# Patient Record
Sex: FEMALE | Race: NATIVE HAWAIIAN OR OTHER PACIFIC ISLANDER | Employment: OTHER | URBAN - METROPOLITAN AREA
[De-identification: names, ages, dates, MRNs, and addresses within clinical notes are randomized per-mention and may not be internally consistent; named-entity substitution may affect disease eponyms.]

---

## 2017-06-21 PROBLEM — F17.210 MODERATE CIGARETTE SMOKER: Status: ACTIVE | Noted: 2017-06-21

## 2017-06-21 PROBLEM — K21.9 GASTROESOPHAGEAL REFLUX DISEASE WITHOUT ESOPHAGITIS: Status: ACTIVE | Noted: 2017-06-21

## 2017-06-21 PROBLEM — J45.40 MODERATE PERSISTENT ASTHMA WITHOUT COMPLICATION: Status: ACTIVE | Noted: 2017-06-21

## 2017-06-21 PROBLEM — F31.12 BIPOLAR AFFECTIVE DISORDER, CURRENTLY MANIC, MODERATE (HCC): Status: ACTIVE | Noted: 2017-06-21

## 2017-09-27 PROBLEM — K21.00 GASTROESOPHAGEAL REFLUX DISEASE WITH ESOPHAGITIS: Status: ACTIVE | Noted: 2017-06-21

## 2017-10-05 PROBLEM — F63.9 IMPULSE CONTROL DISORDER IN ADULT: Status: ACTIVE | Noted: 2017-10-05

## 2017-12-18 PROBLEM — K76.0 FATTY LIVER DISEASE, NONALCOHOLIC: Status: ACTIVE | Noted: 2017-12-18

## 2017-12-18 PROBLEM — K80.10 CALCULUS OF GALLBLADDER WITH CHRONIC CHOLECYSTITIS: Status: ACTIVE | Noted: 2017-12-18

## 2017-12-22 PROBLEM — F41.9 ANXIETY: Status: ACTIVE | Noted: 2017-12-22

## 2018-03-30 PROBLEM — K80.10 CALCULUS OF GALLBLADDER WITH CHRONIC CHOLECYSTITIS: Status: RESOLVED | Noted: 2017-12-18 | Resolved: 2018-03-30

## 2018-03-30 PROBLEM — R27.8: Status: ACTIVE | Noted: 2017-12-07
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
## 2018-03-30 PROBLEM — F31.9 BIPOLAR DISORDER (HCC): Status: ACTIVE | Noted: 2018-03-30

## 2018-03-30 PROBLEM — J45.909 ASTHMA: Status: ACTIVE | Noted: 2018-03-30

## 2018-03-30 PROBLEM — Z87.828 HISTORY OF MOTOR VEHICLE ACCIDENT: Status: ACTIVE | Noted: 2018-03-20

## 2018-06-29 PROBLEM — Z91.14 NOT TAKING MEDICATION AS DIRECTED: Status: ACTIVE | Noted: 2018-06-29

## 2018-07-01 ENCOUNTER — HOSPITAL ENCOUNTER (OUTPATIENT)
Dept: SLEEP MEDICINE | Age: 23
Discharge: HOME | End: 2018-07-01
Payer: MEDICARE

## 2018-07-01 PROCEDURE — 95811 POLYSOM 6/>YRS CPAP 4/> PARM: CPT

## 2018-07-05 PROBLEM — Z99.89 OSA ON CPAP: Status: ACTIVE | Noted: 2018-07-05

## 2018-07-05 PROBLEM — G47.33 OSA ON CPAP: Status: ACTIVE | Noted: 2018-07-05

## 2018-07-10 PROBLEM — G47.10 HYPERSOMNIA: Status: ACTIVE | Noted: 2018-07-10

## 2018-07-10 PROBLEM — G47.33 OSA (OBSTRUCTIVE SLEEP APNEA): Status: ACTIVE | Noted: 2018-07-10

## 2018-07-10 PROBLEM — E66.01 MORBID OBESITY WITH BMI OF 50.0-59.9, ADULT (HCC): Status: ACTIVE | Noted: 2018-07-10

## 2018-07-10 PROBLEM — Z72.821 POOR SLEEP HYGIENE: Status: ACTIVE | Noted: 2018-07-10

## 2018-09-14 PROBLEM — L84 HEEL CALLUS: Status: ACTIVE | Noted: 2018-09-14

## 2018-09-14 PROBLEM — R23.4 FISSURE IN SKIN: Status: ACTIVE | Noted: 2018-09-14

## 2019-01-11 PROBLEM — R23.4 FISSURE IN SKIN: Status: RESOLVED | Noted: 2018-09-14 | Resolved: 2019-01-11

## 2019-06-19 PROBLEM — Z90.49 HISTORY OF CHOLECYSTECTOMY: Status: ACTIVE | Noted: 2019-06-19

## 2019-08-22 PROBLEM — Z86.19 HISTORY OF HELICOBACTER INFECTION: Status: ACTIVE | Noted: 2019-08-06

## 2019-08-22 PROBLEM — A04.8 HELICOBACTER PYLORI INFECTION: Status: ACTIVE | Noted: 2019-08-06

## 2019-12-16 PROBLEM — G47.10 HYPERSOMNIA: Status: RESOLVED | Noted: 2018-07-10 | Resolved: 2019-12-16

## 2020-09-28 ENCOUNTER — APPOINTMENT (OUTPATIENT)
Dept: GENERAL RADIOLOGY | Age: 25
End: 2020-09-28
Attending: EMERGENCY MEDICINE
Payer: MEDICARE

## 2020-09-28 ENCOUNTER — HOSPITAL ENCOUNTER (EMERGENCY)
Age: 25
Discharge: HOME | End: 2020-09-28
Attending: EMERGENCY MEDICINE
Payer: MEDICARE

## 2020-09-28 VITALS
TEMPERATURE: 98.1 F | RESPIRATION RATE: 14 BRPM | OXYGEN SATURATION: 99 % | DIASTOLIC BLOOD PRESSURE: 68 MMHG | WEIGHT: 293 LBS | SYSTOLIC BLOOD PRESSURE: 139 MMHG | HEIGHT: 64 IN | BODY MASS INDEX: 50.02 KG/M2 | HEART RATE: 107 BPM

## 2020-09-28 DIAGNOSIS — J45.901 ACUTE EXACERBATION OF EXTRINSIC ASTHMA: Primary | ICD-10-CM

## 2020-09-28 PROCEDURE — 74011250636 HC RX REV CODE- 250/636: Performed by: EMERGENCY MEDICINE

## 2020-09-28 PROCEDURE — 94644 CONT INHLJ TX 1ST HOUR: CPT

## 2020-09-28 PROCEDURE — 71045 X-RAY EXAM CHEST 1 VIEW: CPT

## 2020-09-28 PROCEDURE — 77030013140 HC MSK NEB VYRM -A

## 2020-09-28 PROCEDURE — 94760 N-INVAS EAR/PLS OXIMETRY 1: CPT

## 2020-09-28 PROCEDURE — 99284 EMERGENCY DEPT VISIT MOD MDM: CPT

## 2020-09-28 PROCEDURE — 96374 THER/PROPH/DIAG INJ IV PUSH: CPT

## 2020-09-28 PROCEDURE — 74011000250 HC RX REV CODE- 250: Performed by: EMERGENCY MEDICINE

## 2020-09-28 PROCEDURE — 77010033678 HC OXYGEN DAILY

## 2020-09-28 RX ORDER — ALBUTEROL SULFATE 0.83 MG/ML
2.5 SOLUTION RESPIRATORY (INHALATION)
Qty: 25 EACH | Refills: 0 | Status: SHIPPED | OUTPATIENT
Start: 2020-09-28 | End: 2020-10-12 | Stop reason: SDUPTHER

## 2020-09-28 RX ORDER — DEXAMETHASONE SODIUM PHOSPHATE 100 MG/10ML
10 INJECTION INTRAMUSCULAR; INTRAVENOUS
Status: COMPLETED | OUTPATIENT
Start: 2020-09-28 | End: 2020-09-28

## 2020-09-28 RX ORDER — ALBUTEROL SULFATE 0.83 MG/ML
10 SOLUTION RESPIRATORY (INHALATION)
Status: COMPLETED | OUTPATIENT
Start: 2020-09-28 | End: 2020-09-28

## 2020-09-28 RX ADMIN — DEXAMETHASONE SODIUM PHOSPHATE 10 MG: 10 INJECTION INTRAMUSCULAR; INTRAVENOUS at 10:11

## 2020-09-28 RX ADMIN — ALBUTEROL SULFATE 10 MG: 2.5 SOLUTION RESPIRATORY (INHALATION) at 10:00

## 2020-09-28 NOTE — ED NOTES
Report received from [facility] <--- Click to Launch ICDx for PreOp, PostOp and Procedure

## 2020-09-28 NOTE — DISCHARGE INSTRUCTIONS
May try nebulizer every 6-8 hours. Finish current medications. Recheck with your doctor 2 to 3 days if not improving. Recheck sooner for worse or worrisome symptoms. Patient Education        Asthma Attack: Care Instructions  Your Care Instructions     During an asthma attack, the airways swell and narrow. This makes it hard to breathe. Severe asthma attacks can be life-threatening, but you can help prevent them by keeping your asthma under control and treating symptoms before they get bad. Symptoms include being short of breath, having chest tightness, coughing, and wheezing. Noting and treating these symptoms can also help you avoid future trips to the emergency room. The doctor has checked you carefully, but problems can develop later. If you notice any problems or new symptoms, get medical treatment right away. Follow-up care is a key part of your treatment and safety. Be sure to make and go to all appointments, and call your doctor if you are having problems. It's also a good idea to know your test results and keep a list of the medicines you take. How can you care for yourself at home? · Follow your asthma action plan to prevent and treat attacks. If you don't have an asthma action plan, work with your doctor to create one. · Take your asthma medicines exactly as prescribed. Talk to your doctor right away if you have any questions about how to take them. ? Use your quick-relief medicine when you have symptoms of an attack. Quick-relief medicine is usually an albuterol inhaler. Some people need to use quick-relief medicine before they exercise. ? Take your controller medicine every day, not just when you have symptoms. Controller medicine is usually an inhaled corticosteroid. The goal is to prevent problems before they occur. Don't use your controller medicine to treat an attack that has already started. It doesn't work fast enough to help.   ? If your doctor prescribed corticosteroid pills to use during an attack, take them exactly as prescribed. It may take hours for the pills to work, but they may make the episode shorter and help you breathe better. ? Keep your quick-relief medicine with you at all times. · Talk to your doctor before using other medicines. Some medicines, such as aspirin, can cause asthma attacks in some people. · If you have a peak flow meter, use it to check how well you are breathing. This can help you predict when an asthma attack is going to occur. Then you can take medicine to prevent the asthma attack or make it less severe. · Do not smoke or allow others to smoke around you. Avoid smoky places. Smoking makes asthma worse. If you need help quitting, talk to your doctor about stop-smoking programs and medicines. These can increase your chances of quitting for good. · Learn what triggers an asthma attack for you, and avoid the triggers when you can. Common triggers include colds, smoke, air pollution, dust, pollen, mold, pets, cockroaches, stress, and cold air. · Avoid colds and the flu. Get a pneumococcal vaccine shot. If you have had one before, ask your doctor if you need a second dose. Get a flu vaccine every fall. If you must be around people with colds or the flu, wash your hands often. When should you call for help? Call 911 anytime you think you may need emergency care. For example, call if:    · You have severe trouble breathing. Call your doctor now or seek immediate medical care if:    · Your symptoms do not get better after you have followed your asthma action plan.     · You have new or worse trouble breathing.     · Your coughing and wheezing get worse.     · You cough up dark brown or bloody mucus (sputum).     · You have a new or higher fever.    Watch closely for changes in your health, and be sure to contact your doctor if:    · You need to use quick-relief medicine on more than 2 days a week (unless it is just for exercise).     · You cough more deeply or more often, especially if you notice more mucus or a change in the color of your mucus.     · You are not getting better as expected. Where can you learn more? Go to http://www.gray.com/  Enter F886 in the search box to learn more about \"Asthma Attack: Care Instructions. \"  Current as of: February 24, 2020               Content Version: 12.6  © 2625-3642 [employer]. Care instructions adapted under license by [employer] (which disclaims liability or warranty for this information). If you have questions about a medical condition or this instruction, always ask your healthcare professional. [place] [address] any warranty or liability for your use of this information.

## 2020-09-28 NOTE — ED NOTES
I have reviewed discharge instructions with the patient. The patient verbalized understanding. Patient left ED via Discharge Method: ambulatory to Home with family. Opportunity for questions and clarification provided. Patient given 1 scripts. Albuterol neb solution. Pt given nebulizer machine by case management. Instructed to follow up with PCP and finish current medications. To continue your aftercare when you leave the hospital, you may receive an automated call from our care team to check in on how you are doing. This is a free service and part of our promise to provide the best care and service to meet your aftercare needs.  If you have questions, or wish to unsubscribe from this service please call [phone]. Thank you for Choosing our [facility] Emergency Department.

## 2020-09-28 NOTE — ED TRIAGE NOTES
Pt arrives wearing home mask. Presents with initial complaint of \"lip ring stuck in lip. \" Lower lip is swollen. Pt then states that she is possibly having an allergic reaction to a [place] latte\" with itching to hands/feet and feeling like she is having difficulty breathing. Pt's O2 sats 86% RA. Some wheezing heard. Tested negative for COVID on Friday.

## 2020-09-28 NOTE — PROGRESS NOTES
Billing Type: Third-Party Bill X-ray delay. Breathing treatment for approx 30 min. Performing Laboratory: [phone] Expected Date Of Service: 01/22/2020 Bill For Surgical Tray: no

## 2020-09-28 NOTE — ED PROVIDER NOTES
21-year-old female states some swelling of her lip today. She has a lip ring and she complains of slight itching but no pain or drainage or fever. She also complains of shortness of breath and has had a cough with wheezing for 5 days. She saw her primary care doctor via telemedicine and was placed on Zithromax and prednisone. She has been trying an inhaler and that has not improved things. The wheezing is worse. She did have a COVID test 3 days ago that was negative. Does have a history of asthma. The history is provided by the patient. Lip Swelling    The incident occurred 6 to 12 hours ago. Pertinent negatives include no vomiting. Allergic Reaction    Associated symptoms include shortness of breath. Pertinent negatives include no vomiting. Shortness of Breath   This is a new problem. The current episode started more than 2 days ago. The problem has been gradually worsening. Associated symptoms include rhinorrhea, cough, wheezing and chest pain. Pertinent negatives include no fever, no sputum production, no hemoptysis, no vomiting, no abdominal pain, no rash, no leg pain and no leg swelling. Associated medical issues include asthma. Associated medical issues do not include PE, heart failure, DVT or recent surgery.         Past Medical History:   Diagnosis Date    Allergic rhinitis, cause unspecified 10/10/2014    Borderline personality disorder ([facility] [address] 10/10/2014    History of drug overdose     trazodone    History of iron deficiency     Learning disability     Other emotional disturbance of childhood or adolescence 10/10/2014    Reactive attachment disorder        Past Surgical History:   Procedure Laterality Date    HX LAP CHOLECYSTECTOMY  12/11/2017    HX ORTHOPAEDIC      wrist         Family History:   Adopted: Yes   Problem Relation Age of Onset    No Known Problems Mother         Patient is adopted    No Known Problems Father         Patient is adopted       Social History Socioeconomic History    Marital status: SINGLE     Spouse name: Not on file    Number of children: Not on file    Years of education: Not on file    Highest education level: Not on file   Occupational History    Not on file   Social Needs    Financial resource strain: Not on file    Food insecurity     Worry: Not on file     Inability: Not on file    Transportation needs     Medical: Not on file     Non-medical: Not on file   Tobacco Use    Smoking status: Current Every Day Smoker     Packs/day: 0.75     Years: 5.00     Pack years: 3.75    Smokeless tobacco: Never Used    Tobacco comment: Few cigarettes per day   Substance and Sexual Activity    Alcohol use: Yes     Alcohol/week: 0.0 standard drinks     Comment: Occass    Drug use: No     Comment: previously meth and Marijuana    Sexual activity: Not on file   Lifestyle    Physical activity     Days per week: Not on file     Minutes per session: Not on file    Stress: Not on file   Relationships    Social connections     Talks on phone: Not on file     Gets together: Not on file     Attends [employer] service: Not on file     Active member of club or organization: Not on file     Attends meetings of clubs or organizations: Not on file     Relationship status: Not on file    Intimate partner violence     Fear of current or ex partner: Not on file     Emotionally abused: Not on file     Physically abused: Not on file     Forced sexual activity: Not on file   Other Topics Concern    Not on file   Social History Narrative    10th grader at [employer]. Moved here from [facility] to go to [address] [place] [address], now at [facility]. Really has no pets. Currently drinks excessive caffeine intake. Patient is adopted. ALLERGIES: Acetaminophen; Ketorolac; and Other plant, animal, environmental    Review of Systems   Constitutional: Positive for chills. Negative for fever. HENT: Positive for rhinorrhea.     Respiratory: Positive for cough, shortness of breath and wheezing. Negative for hemoptysis and sputum production. Cardiovascular: Positive for chest pain. Negative for leg swelling. Gastrointestinal: Negative for abdominal pain and vomiting. Genitourinary: Negative for difficulty urinating and dyspareunia. Musculoskeletal: Positive for myalgias. Negative for back pain. Skin: Negative for color change and rash. Vitals:    09/28/20 0940   BP: 114/70   Pulse: (!) 123   Resp: 16   Temp: 98.1 °F (36.7 °C)   SpO2: (!) 86%   Weight: 145.2 kg (320 lb)   Height: 5' 4\" (1.626 m)            Physical Exam  Vitals signs and nursing note reviewed. Constitutional:       General: She is not in acute distress. Appearance: She is well-developed. HENT:      Head: Normocephalic and atraumatic. Right Ear: External ear normal.      Left Ear: External ear normal.      Mouth/Throat:      Pharynx: No oropharyngeal exudate. Comments: No intraoral swelling but significant amount of edema right lower lip. Embedded piercing. Eyes:      Conjunctiva/sclera: Conjunctivae normal.      Pupils: Pupils are equal, round, and reactive to light. Neck:      Musculoskeletal: Normal range of motion and neck supple. Cardiovascular:      Rate and Rhythm: Normal rate and regular rhythm. Heart sounds: No murmur. Pulmonary:      Effort: No respiratory distress. Breath sounds: Wheezing present. Abdominal:      General: Bowel sounds are normal.      Palpations: Abdomen is soft. There is no mass. Tenderness: There is no abdominal tenderness. There is no guarding or rebound. Hernia: No hernia is present. Skin:     General: Skin is warm and dry. Neurological:      Mental Status: She is alert and oriented to person, place, and time.       Gait: Gait normal.      Comments: Nl speech   Psychiatric:         Speech: Speech normal.          MDM  Number of Diagnoses or Management Options  Diagnosis management comments: Patient hypoxemic. Suspect hypoxemia from exacerbation of asthma. Chest x-ray to assess for pneumonia or effusion or pneumothorax. Screening blood work. Doubt COVID since recent test was negative. Lip swelling seems more allergic reaction then infection. Amount and/or Complexity of Data Reviewed  Clinical lab tests: ordered and reviewed  Tests in the radiology section of CPT®: ordered and reviewed    Risk of Complications, Morbidity, and/or Mortality  Presenting problems: moderate  Diagnostic procedures: minimal  Management options: low    Patient Progress  Patient progress: improved         Foreign Body Removal    Date/Time: 9/28/2020 10:03 AM  Performed by: Bethany Mathur MD  Authorized by: Bethany Mathur MD     Consent:     Consent obtained:  Verbal  Location:     Location:  Mouth    Mouth location:  Lower inner lip    Depth: Intradermal  Anesthesia (see MAR for exact dosages): Anesthesia method:  None  Procedure type:     Procedure complexity:  Simple  Procedure details:     Removal mechanism:  Hemostat    Foreign bodies recovered:  1    Intact foreign body removal: yes    Comments:      Hemostats were used to grasp the internal/external in pieces of the piercing. Was able to remove the piercing after unscrewing the bead. Patient tolerated well. Minimally embedded. Results Include:    No results found for this or any previous visit (from the past 24 hour(s)). Xr Chest Port    Result Date: 9/28/2020  EXAMINATION: CHEST RADIOGRAPH 9/28/2020 10:56 AM INDICATION: Shortness of breath, wheezing, coughing for 5 days COMPARISON: None TECHNIQUE: A single view of the chest was obtained. FINDINGS: Support Devices: None Osseous : No acute abnormality. Cardiomediastinal Silhouette: Normal in size. Lungs: Clear lungs. Normal pulmonary vascularity. Pleura: No pleural fluid or pneumothorax. Upper Abdomen: No abnormality. IMPRESSION: No acute abnormality. 1:00 PM  Feels better.   O2 saturation good on room air.

## 2020-11-30 ENCOUNTER — APPOINTMENT (OUTPATIENT)
Dept: GENERAL RADIOLOGY | Age: 25
End: 2020-11-30
Attending: EMERGENCY MEDICINE
Payer: MEDICARE

## 2020-11-30 ENCOUNTER — HOSPITAL ENCOUNTER (EMERGENCY)
Age: 25
Discharge: HOME | End: 2020-11-30
Attending: EMERGENCY MEDICINE
Payer: MEDICARE

## 2020-11-30 VITALS
HEIGHT: 64 IN | RESPIRATION RATE: 16 BRPM | HEART RATE: 92 BPM | OXYGEN SATURATION: 95 % | TEMPERATURE: 98.4 F | WEIGHT: 293 LBS | SYSTOLIC BLOOD PRESSURE: 104 MMHG | BODY MASS INDEX: 50.02 KG/M2 | DIASTOLIC BLOOD PRESSURE: 77 MMHG

## 2020-11-30 DIAGNOSIS — R07.89 ATYPICAL CHEST PAIN: Primary | ICD-10-CM

## 2020-11-30 LAB
ALBUMIN SERPL-MCNC: 3.3 G/DL (ref 3.5–5)
ALBUMIN/GLOB SERPL: 0.8 {RATIO} (ref 1.2–3.5)
ALP SERPL-CCNC: 96 U/L (ref 50–136)
ALT SERPL-CCNC: 23 U/L (ref 12–65)
ANION GAP SERPL CALC-SCNC: 8 MMOL/L (ref 7–16)
AST SERPL-CCNC: 25 U/L (ref 15–37)
BASOPHILS # BLD: 0.1 K/UL (ref 0–0.2)
BASOPHILS NFR BLD: 1 % (ref 0–2)
BILIRUB SERPL-MCNC: 0.3 MG/DL (ref 0.2–1.1)
BUN SERPL-MCNC: 12 MG/DL (ref 6–23)
CALCIUM SERPL-MCNC: 9.1 MG/DL (ref 8.3–10.4)
CHLORIDE SERPL-SCNC: 104 MMOL/L (ref 98–107)
CO2 SERPL-SCNC: 26 MMOL/L (ref 21–32)
CREAT SERPL-MCNC: 0.58 MG/DL (ref 0.6–1)
DIFFERENTIAL METHOD BLD: ABNORMAL
EOSINOPHIL # BLD: 0.9 K/UL (ref 0–0.8)
EOSINOPHIL NFR BLD: 8 % (ref 0.5–7.8)
ERYTHROCYTE [DISTWIDTH] IN BLOOD BY AUTOMATED COUNT: 14.8 % (ref 11.9–14.6)
GLOBULIN SER CALC-MCNC: 4 G/DL (ref 2.3–3.5)
GLUCOSE SERPL-MCNC: 115 MG/DL (ref 65–100)
HCG UR QL: NEGATIVE
HCT VFR BLD AUTO: 37.2 % (ref 35.8–46.3)
HGB BLD-MCNC: 11.7 G/DL (ref 11.7–15.4)
IMM GRANULOCYTES # BLD AUTO: 0.1 K/UL (ref 0–0.5)
IMM GRANULOCYTES NFR BLD AUTO: 1 % (ref 0–5)
LYMPHOCYTES # BLD: 3.4 K/UL (ref 0.5–4.6)
LYMPHOCYTES NFR BLD: 31 % (ref 13–44)
MCH RBC QN AUTO: 23.8 PG (ref 26.1–32.9)
MCHC RBC AUTO-ENTMCNC: 31.5 G/DL (ref 31.4–35)
MCV RBC AUTO: 75.8 FL (ref 79.6–97.8)
MONOCYTES # BLD: 0.7 K/UL (ref 0.1–1.3)
MONOCYTES NFR BLD: 6 % (ref 4–12)
NEUTS SEG # BLD: 5.9 K/UL (ref 1.7–8.2)
NEUTS SEG NFR BLD: 53 % (ref 43–78)
NRBC # BLD: 0 K/UL (ref 0–0.2)
PLATELET # BLD AUTO: 371 K/UL (ref 150–450)
PMV BLD AUTO: 9.8 FL (ref 9.4–12.3)
POTASSIUM SERPL-SCNC: 3.7 MMOL/L (ref 3.5–5.1)
PROT SERPL-MCNC: 7.3 G/DL (ref 6.3–8.2)
RBC # BLD AUTO: 4.91 M/UL (ref 4.05–5.2)
SODIUM SERPL-SCNC: 138 MMOL/L (ref 136–145)
TROPONIN-HIGH SENSITIVITY: 3.5 PG/ML (ref 0–14)
TROPONIN-HIGH SENSITIVITY: <3 PG/ML (ref 0–14)
WBC # BLD AUTO: 11 K/UL (ref 4.3–11.1)

## 2020-11-30 PROCEDURE — 81003 URINALYSIS AUTO W/O SCOPE: CPT

## 2020-11-30 PROCEDURE — 99285 EMERGENCY DEPT VISIT HI MDM: CPT

## 2020-11-30 PROCEDURE — 80053 COMPREHEN METABOLIC PANEL: CPT

## 2020-11-30 PROCEDURE — 81025 URINE PREGNANCY TEST: CPT

## 2020-11-30 PROCEDURE — 93005 ELECTROCARDIOGRAM TRACING: CPT | Performed by: EMERGENCY MEDICINE

## 2020-11-30 PROCEDURE — 85025 COMPLETE CBC W/AUTO DIFF WBC: CPT

## 2020-11-30 PROCEDURE — 84484 ASSAY OF TROPONIN QUANT: CPT

## 2020-11-30 PROCEDURE — 71045 X-RAY EXAM CHEST 1 VIEW: CPT

## 2020-12-01 ENCOUNTER — PATIENT OUTREACH (OUTPATIENT)
Dept: CASE MANAGEMENT | Age: 25
End: 2020-12-01

## 2020-12-01 LAB
ATRIAL RATE: 109 BPM
CALCULATED P AXIS, ECG09: 44 DEGREES
CALCULATED R AXIS, ECG10: 75 DEGREES
CALCULATED T AXIS, ECG11: 53 DEGREES
DIAGNOSIS, 93000: NORMAL
P-R INTERVAL, ECG05: 134 MS
Q-T INTERVAL, ECG07: 338 MS
QRS DURATION, ECG06: 84 MS
QTC CALCULATION (BEZET), ECG08: 455 MS
VENTRICULAR RATE, ECG03: 109 BPM

## 2020-12-01 NOTE — ED TRIAGE NOTES
Patient presents with c/o chest pain that started about 4 days ago that has gradually gotten worse. Patient has no cardia hx.  Patient masked on arrival.

## 2020-12-01 NOTE — PROGRESS NOTES
Patient returned my call and I was able to phone patient back about BRENDA for ED visit on 2020 for chest pain  Patient states that she is doing better today   Encouraged patient to follow up with PCP and follow COVID19 guidelines  Patient contacted regarding recent discharge and COVID-19 risk. Discussed COVID-19 related testing which was not done at this time. Test results were not done. Patient informed of results, if available? N/ a    Care Transition Nurse/ Ambulatory Care Manager/ LPN Care Coordinator contacted the patient by telephone to perform post discharge assessment. Verified name and  with patient as identifiers. Patient has following risk factors of: asthma. CTN/ACM/LPN reviewed discharge instructions, medical action plan and red flags related to discharge diagnosis. Reviewed and educated them on any new and changed medications related to discharge diagnosis. Advised obtaining a 90-day supply of all daily and as-needed medications. Advance Care Planning:   Does patient have an Advance Directive: patient's father is her ACP healthcare decision maker. Education provided regarding infection prevention, and signs and symptoms of COVID-19 and when to seek medical attention with patient who verbalized understanding. Discussed exposure protocols and quarantine from [facility] [facility] [address] you at higher risk for severe illness  and given an opportunity for questions and concerns. The patient agrees to contact the COVID-19 hotline [phone] or PCP office for questions related to their healthcare. CTN/ACM/LPN provided contact information for future reference. From CDC: Are you at higher risk for severe illness?  Wash your hands often.  Avoid close contact (6 feet, which is about two arm lengths) with people who are sick.  Put distance between yourself and other people if COVID-19 is spreading in your community.  Clean and disinfect frequently touched surfaces.    Avoid all cruise travel and non-essential air travel.  Call your healthcare professional if you have concerns about COVID-19 and your underlying condition or if you are sick. For more information on steps you can take to protect yourself, see CDC's How to Protect Yourself      Patient/family/caregiver given information for [employer] and agrees to enroll no      Plan for follow-up call in 7-14 days based on severity of symptoms and risk factors.

## 2020-12-01 NOTE — PROGRESS NOTES
Date/Time:  12/1/2020 2:59 PM   Call within 2 business days of discharge: Yes   Attempted to reach patient by telephone.    UTR no answer at this time  Case closed due to UTR

## 2020-12-01 NOTE — ED PROVIDER NOTES
Patient presents the ER complaint of chest pain. States for the past 4 days she has had intermittent episodes of chest pain in the center part of her chest.  Describes as a sharp pain lasting anywhere from minutes to hours. Reports occasional shortness of breath. States pain is sometimes made worse by certain positions. Denies any associated nausea, vomiting or diaphoresis. Reports he has had issues with allergies recently and currently receiving allergy shots. Denies any fevers or chest trauma    The history is provided by the patient. Chest Pain    This is a recurrent problem. The current episode started more than 2 days ago. The problem has not changed since onset. The pain is present in the substernal region. The pain is at a severity of 4/10. The quality of the pain is described as sharp. Associated symptoms include cough and shortness of breath. Pertinent negatives include no abdominal pain, no back pain, no diaphoresis, no dizziness, no fever, no headaches, no irregular heartbeat, no leg pain, no near-syncope, no numbness, no orthopnea, no PND and no vomiting.         Past Medical History:   Diagnosis Date    Allergic rhinitis, cause unspecified 10/10/2014    Borderline personality disorder ([facility] [address] 10/10/2014    History of drug overdose     trazodone    History of iron deficiency     Learning disability     Other emotional disturbance of childhood or adolescence 10/10/2014    Reactive attachment disorder        Past Surgical History:   Procedure Laterality Date    HX LAP CHOLECYSTECTOMY  12/11/2017    HX ORTHOPAEDIC      wrist         Family History:   Adopted: Yes   Problem Relation Age of Onset    No Known Problems Mother         Patient is adopted    No Known Problems Father         Patient is adopted       Social History     Socioeconomic History    Marital status: SINGLE     Spouse name: Not on file    Number of children: Not on file    Years of education: Not on file    Highest education level: Not on file   Occupational History    Not on file   Social Needs    Financial resource strain: Not on file    Food insecurity     Worry: Not on file     Inability: Not on file    Transportation needs     Medical: Not on file     Non-medical: Not on file   Tobacco Use    Smoking status: Current Every Day Smoker     Packs/day: 0.75     Years: 5.00     Pack years: 3.75    Smokeless tobacco: Never Used    Tobacco comment: Few cigarettes per day   Substance and Sexual Activity    Alcohol use: Yes     Alcohol/week: 0.0 standard drinks     Comment: Occass    Drug use: No     Comment: previously meth and Marijuana    Sexual activity: Not on file   Lifestyle    Physical activity     Days per week: Not on file     Minutes per session: Not on file    Stress: Not on file   Relationships    Social connections     Talks on phone: Not on file     Gets together: Not on file     Attends [employer] service: Not on file     Active member of club or organization: Not on file     Attends meetings of clubs or organizations: Not on file     Relationship status: Not on file    Intimate partner violence     Fear of current or ex partner: Not on file     Emotionally abused: Not on file     Physically abused: Not on file     Forced sexual activity: Not on file   Other Topics Concern    Not on file   Social History Narrative    10th grader at [employer]. Moved here from [facility] to go to [address] [place] [address], now at [facility]. Really has no pets. Currently drinks excessive caffeine intake. Patient is adopted. ALLERGIES: Acetaminophen; Ketorolac; and Other plant, animal, environmental    Review of Systems   Constitutional: Negative for diaphoresis, fever and unexpected weight change. HENT: Negative for congestion, trouble swallowing and voice change. Eyes: Negative for redness. Respiratory: Positive for cough, chest tightness and shortness of breath.     Cardiovascular: Positive for chest pain. Negative for orthopnea, PND and near-syncope. Gastrointestinal: Negative for abdominal pain and vomiting. Genitourinary: Negative for decreased urine volume, flank pain, frequency and genital sores. Musculoskeletal: Negative for back pain, myalgias and neck pain. Skin: Negative for color change and pallor. Neurological: Negative for dizziness, syncope, speech difficulty, numbness and headaches. Hematological: Negative for adenopathy. Does not bruise/bleed easily. Psychiatric/Behavioral: Negative for behavioral problems and confusion. All other systems reviewed and are negative. Vitals:    11/30/20 2044   BP: 122/77   Pulse: (!) 109   Resp: 18   Temp: 98.4 °F (36.9 °C)   SpO2: 97%   Weight: 140.6 kg (310 lb)   Height: 5' 4\" (1.626 m)            Physical Exam  Vitals signs reviewed. Constitutional:       Appearance: Normal appearance. HENT:      Head: Normocephalic and atraumatic. Nose: Nose normal. No congestion or rhinorrhea. Mouth/Throat:      Mouth: Mucous membranes are moist.      Pharynx: No oropharyngeal exudate or posterior oropharyngeal erythema. Eyes:      Extraocular Movements: Extraocular movements intact. Conjunctiva/sclera: Conjunctivae normal.      Pupils: Pupils are equal, round, and reactive to light. Neck:      Musculoskeletal: Normal range of motion and neck supple. No neck rigidity or muscular tenderness. Cardiovascular:      Rate and Rhythm: Normal rate and regular rhythm. Pulses: Normal pulses. Heart sounds: Normal heart sounds. No murmur. No friction rub. Pulmonary:      Effort: Pulmonary effort is normal. No respiratory distress. Breath sounds: Normal breath sounds. No stridor. Abdominal:      General: Abdomen is flat. There is no distension. Palpations: Abdomen is soft. There is no mass. Tenderness: There is no abdominal tenderness. Hernia: No hernia is present.    Skin:     General: Skin is warm and dry.      Capillary Refill: Capillary refill takes less than 2 seconds. Coloration: Skin is not jaundiced or pale. Findings: No bruising. Neurological:      General: No focal deficit present. Mental Status: She is alert and oriented to person, place, and time. Mental status is at baseline. Cranial Nerves: No cranial nerve deficit. Sensory: No sensory deficit. MDM  Number of Diagnoses or Management Options  Diagnosis management comments: Well-appearing here, low suspicion for emergent pathology. Will obtain screening labs chest x-ray and EKG    11:25 PM  Stable EKG. Normal initial labs including normal troponin. Chest x-ray. Patient voices she feels better wants to go home. I do not feel she needs serial cardiac enzymes given the nature of her pain. Her vital signs remained stable.   We will have her follow-up with her PCP       Amount and/or Complexity of Data Reviewed  Clinical lab tests: ordered and reviewed  Tests in the radiology section of CPT®: ordered and reviewed  Review and summarize past medical records: yes  Independent visualization of images, tracings, or specimens: yes    Risk of Complications, Morbidity, and/or Mortality  Presenting problems: moderate  Diagnostic procedures: low  Management options: low    Patient Progress  Patient progress: stable         Procedures               Results Include:    Recent Results (from the past 24 hour(s))   METABOLIC PANEL, COMPREHENSIVE    Collection Time: 11/30/20  9:11 PM   Result Value Ref Range    Sodium 138 136 - 145 mmol/L    Potassium 3.7 3.5 - 5.1 mmol/L    Chloride 104 98 - 107 mmol/L    CO2 26 21 - 32 mmol/L    Anion gap 8 7 - 16 mmol/L    Glucose 115 (H) 65 - 100 mg/dL    BUN 12 6 - 23 MG/DL    Creatinine 0.58 (L) 0.6 - 1.0 MG/DL    GFR est AA >60 >60 ml/min/1.73m2    GFR est non-AA >60 >60 ml/min/1.73m2    Calcium 9.1 8.3 - 10.4 MG/DL    Bilirubin, total 0.3 0.2 - 1.1 MG/DL    ALT (SGPT) 23 12 - 65 U/L    AST (SGOT) 25 15 - 37 U/L    Alk. phosphatase 96 50 - 136 U/L    Protein, total 7.3 6.3 - 8.2 g/dL    Albumin 3.3 (L) 3.5 - 5.0 g/dL    Globulin 4.0 (H) 2.3 - 3.5 g/dL    A-G Ratio 0.8 (L) 1.2 - 3.5     CBC WITH AUTOMATED DIFF    Collection Time: 11/30/20  9:11 PM   Result Value Ref Range    WBC 11.0 4.3 - 11.1 K/uL    RBC 4.91 4.05 - 5.2 M/uL    HGB 11.7 11.7 - 15.4 g/dL    HCT 37.2 35.8 - 46.3 %    MCV 75.8 (L) 79.6 - 97.8 FL    MCH 23.8 (L) 26.1 - 32.9 PG    MCHC 31.5 31.4 - 35.0 g/dL    RDW 14.8 (H) 11.9 - 14.6 %    PLATELET 118 228 - 275 K/uL    MPV 9.8 9.4 - 12.3 FL    ABSOLUTE NRBC 0.00 0.0 - 0.2 K/uL    DF AUTOMATED      NEUTROPHILS 53 43 - 78 %    LYMPHOCYTES 31 13 - 44 %    MONOCYTES 6 4.0 - 12.0 %    EOSINOPHILS 8 (H) 0.5 - 7.8 %    BASOPHILS 1 0.0 - 2.0 %    IMMATURE GRANULOCYTES 1 0.0 - 5.0 %    ABS. NEUTROPHILS 5.9 1.7 - 8.2 K/UL    ABS. LYMPHOCYTES 3.4 0.5 - 4.6 K/UL    ABS. MONOCYTES 0.7 0.1 - 1.3 K/UL    ABS. EOSINOPHILS 0.9 (H) 0.0 - 0.8 K/UL    ABS. BASOPHILS 0.1 0.0 - 0.2 K/UL    ABS. IMM. GRANS. 0.1 0.0 - 0.5 K/UL   TROPONIN-HIGH SENSITIVITY    Collection Time: 11/30/20  9:11 PM   Result Value Ref Range    Troponin-High Sensitivity 3.5 0 - 14 pg/mL     Voice dictation software was used during the making of this note. This software is not perfect and grammatical and other typographical errors may be present. This note has been proofread, but may still contain errors.   Rod Stephen MD; 11/30/2020 @11:25 PM   ===================================================================

## 2020-12-01 NOTE — DISCHARGE INSTRUCTIONS
As we discussed, your testing done today shows no signs of any serious or life-threatening illnesses  Follow-up with your primary care physician  Return to the ER for any new, worsening or life-threatening symptoms      Chest Pain: Care Instructions  Your Care Instructions     There are many things that can cause chest pain. Some are not serious and will get better on their own in a few days. But some kinds of chest pain need more testing and treatment. Your doctor may have recommended a follow-up visit in the next 8 to 12 hours. If you are not getting better, you may need more tests or treatment. Even though your doctor has released you, you still need to watch for any problems. The doctor carefully checked you, but sometimes problems can develop later. If you have new symptoms or if your symptoms do not get better, get medical care right away. If you have worse or different chest pain or pressure that lasts more than 5 minutes or you passed out (lost consciousness), call 911 or seek other emergency help right away. A medical visit is only one step in your treatment. Even if you feel better, you still need to do what your doctor recommends, such as going to all suggested follow-up appointments and taking medicines exactly as directed. This will help you recover and help prevent future problems. How can you care for yourself at home? · Rest until you feel better. · Take your medicine exactly as prescribed. Call your doctor if you think you are having a problem with your medicine. · Do not drive after taking a prescription pain medicine. When should you call for help? Call 911 if:     · You passed out (lost consciousness).     · You have severe difficulty breathing.     · You have symptoms of a heart attack. These may include:  ? Chest pain or pressure, or a strange feeling in your chest.  ? Sweating. ? Shortness of breath. ? Nausea or vomiting.   ? Pain, pressure, or a strange feeling in your back, neck, jaw, or upper belly or in one or both shoulders or arms. ? Lightheadedness or sudden weakness. ? A fast or irregular heartbeat. After you call 911, the  may tell you to chew 1 adult-strength or 2 to 4 low-dose aspirin. Wait for an ambulance. Do not try to drive yourself. Call your doctor today if:     · You have any trouble breathing.     · Your chest pain gets worse.     · You are dizzy or lightheaded, or you feel like you may faint.     · You are not getting better as expected.     · You are having new or different chest pain. Where can you learn more? Go to http://www.soto.com/  Enter A120 in the search box to learn more about \"Chest Pain: Care Instructions. \"  Current as of: June 26, 2019               Content Version: 12.6  © 8318-5072 [employer]. Care instructions adapted under license by [employer] (which disclaims liability or warranty for this information). If you have questions about a medical condition or this instruction, always ask your healthcare professional. [address] any warranty or liability for your use of this information.

## 2020-12-01 NOTE — ED NOTES
I have reviewed discharge instructions with the patient. The patient verbalized understanding. Patient left ED via Discharge Method: ambulatory to Home with SO. Opportunity for questions and clarification provided. Patient given 0 scripts.

## 2020-12-07 ENCOUNTER — HOSPITAL ENCOUNTER (EMERGENCY)
Age: 25
Discharge: HOME | End: 2020-12-07
Attending: STUDENT IN AN ORGANIZED HEALTH CARE EDUCATION/TRAINING PROGRAM
Payer: MEDICARE

## 2020-12-07 VITALS
SYSTOLIC BLOOD PRESSURE: 117 MMHG | DIASTOLIC BLOOD PRESSURE: 83 MMHG | HEART RATE: 105 BPM | BODY MASS INDEX: 50.02 KG/M2 | WEIGHT: 293 LBS | OXYGEN SATURATION: 95 % | TEMPERATURE: 98.3 F | RESPIRATION RATE: 18 BRPM | HEIGHT: 64 IN

## 2020-12-07 DIAGNOSIS — J02.9 ACUTE PHARYNGITIS, UNSPECIFIED ETIOLOGY: Primary | ICD-10-CM

## 2020-12-07 PROCEDURE — 99282 EMERGENCY DEPT VISIT SF MDM: CPT

## 2020-12-07 RX ORDER — BUDESONIDE AND FORMOTEROL FUMARATE DIHYDRATE 160; 4.5 UG/1; UG/1
2 AEROSOL RESPIRATORY (INHALATION)
COMMUNITY

## 2020-12-07 NOTE — ED NOTES
I have reviewed discharge instructions with the patient. The patient verbalized understanding. Patient left ED via Discharge Method: ambulatory to Home with male at bedside. Opportunity for questions and clarification provided. Patient given 0 scripts. To continue your aftercare when you leave the hospital, you may receive an automated call from our care team to check in on how you are doing. This is a free service and part of our promise to provide the best care and service to meet your aftercare needs.  If you have questions, or wish to unsubscribe from this service please call [phone]. Thank you for Choosing our [facility] Emergency Department.

## 2020-12-07 NOTE — DISCHARGE INSTRUCTIONS
Take over-the-counter throat lozenges as needed for sore throat. Follow-up with family physician as needed. Return to the ER for worsening or worrisome symptoms.

## 2020-12-07 NOTE — ED PROVIDER NOTES
Patient is a 80-year-old female history of chronic allergies, presents to [facility] complaining of sore throat. States the symptoms have been ongoing since yesterday. Denies fever chills. She also endorses a mild, nonproductive cough which is intermittently present. Patient reports no difficulty swallowing, eating, breathing. No history of strep throat in the past.           Past Medical History:   Diagnosis Date    Allergic rhinitis, cause unspecified 10/10/2014    Asthma     Borderline personality disorder ([address] 10/10/2014    History of drug overdose     trazodone    History of iron deficiency     Learning disability     Other emotional disturbance of childhood or adolescence 10/10/2014    Psychiatric disorder     Reactive attachment disorder        Past Surgical History:   Procedure Laterality Date    HX LAP CHOLECYSTECTOMY  12/11/2017    HX ORTHOPAEDIC      wrist         Family History:   Adopted: Yes   Problem Relation Age of Onset    No Known Problems Mother         Patient is adopted    No Known Problems Father         Patient is adopted       Social History     Socioeconomic History    Marital status: SINGLE     Spouse name: Not on file    Number of children: Not on file    Years of education: Not on file    Highest education level: Not on file   Occupational History    Not on file   Social Needs    Financial resource strain: Not on file    Food insecurity     Worry: Not on file     Inability: Not on file    Transportation needs     Medical: Not on file     Non-medical: Not on file   Tobacco Use    Smoking status: Current Every Day Smoker     Packs/day: 0.75     Years: 5.00     Pack years: 3.75    Smokeless tobacco: Never Used    Tobacco comment: Few cigarettes per day   Substance and Sexual Activity    Alcohol use:  Yes     Alcohol/week: 0.0 standard drinks     Comment: Occass    Drug use: No     Comment: previously meth and Marijuana    Sexual activity: Not on file Lifestyle    Physical activity     Days per week: Not on file     Minutes per session: Not on file    Stress: Not on file   Relationships    Social connections     Talks on phone: Not on file     Gets together: Not on file     Attends [employer]ist service: Not on file     Active member of club or organization: Not on file     Attends meetings of clubs or organizations: Not on file     Relationship status: Not on file    Intimate partner violence     Fear of current or ex partner: Not on file     Emotionally abused: Not on file     Physically abused: Not on file     Forced sexual activity: Not on file   Other Topics Concern    Not on file   Social History Narrative    10th grader at [employer]. Moved here from [facility] to go to [address] [place] [address], now at [employer]. Really has no pets. Currently drinks excessive caffeine intake. Patient is adopted. ALLERGIES: Acetaminophen; Ketorolac; Other plant, animal, environmental; and Pcn [penicillins]    Review of Systems   Constitutional: Negative for chills, fatigue and fever. HENT: Positive for sore throat. Negative for facial swelling. Eyes: Negative for visual disturbance. Respiratory: Positive for cough. Negative for chest tightness and shortness of breath. Cardiovascular: Negative for chest pain and palpitations. Gastrointestinal: Negative for abdominal pain, diarrhea, nausea and vomiting. Genitourinary: Negative for difficulty urinating, dysuria and flank pain. Musculoskeletal: Negative for myalgias, neck pain and neck stiffness. Skin: Negative for color change. Neurological: Negative for dizziness, speech difficulty, weakness, numbness and headaches. Psychiatric/Behavioral: Negative for confusion.        Vitals:    12/07/20 0850   BP: 117/83   Pulse: (!) 105   Resp: 18   Temp: 98.3 °F (36.8 °C)   SpO2: 95%   Weight: 140.6 kg (310 lb)   Height: 5' 4\" (1.626 m)            Physical Exam  Vitals signs and nursing note reviewed. Constitutional:       Appearance: Normal appearance. She is well-developed. She is not ill-appearing or toxic-appearing. HENT:      Head: Normocephalic and atraumatic. Nose: Nose normal.      Mouth/Throat:      Mouth: Mucous membranes are moist. No oral lesions. Pharynx: Oropharynx is clear. Uvula midline. No pharyngeal swelling, oropharyngeal exudate, posterior oropharyngeal erythema or uvula swelling. Tonsils: No tonsillar exudate. Eyes:      Extraocular Movements: Extraocular movements intact. Neck:      Musculoskeletal: Normal range of motion. No neck rigidity. Cardiovascular:      Rate and Rhythm: Normal rate and regular rhythm. Pulses: Normal pulses. Heart sounds: Normal heart sounds. Pulmonary:      Effort: Pulmonary effort is normal. No respiratory distress. Breath sounds: Normal breath sounds. Abdominal:      General: Abdomen is flat. There is no distension. Palpations: Abdomen is soft. Tenderness: There is no abdominal tenderness. Musculoskeletal: Normal range of motion. Skin:     General: Skin is warm and dry. Neurological:      General: No focal deficit present. Mental Status: She is alert and oriented to person, place, and time. Psychiatric:         Mood and Affect: Mood normal.          MDM  Number of Diagnoses or Management Options  Diagnosis management comments: Patient extremely well-appearing, seen wearing appropriate PPE. Patient complaining of sore throat x1 day. Posterior oropharynx is normal in appearance, no erythema or exudate. No medications required time. Patient continue taking over-the-counter medications for symptoms. Follow-up with family physician in 2 to 3 days. Return to the ER for worsening or worrisome symptoms. She voiced understanding and agreement with this plan.     Risk of Complications, Morbidity, and/or Mortality  Presenting problems: minimal  Diagnostic procedures: minimal  Management options: minimal           Procedures

## 2020-12-07 NOTE — Clinical Note
[id number] [address] EMERGENCY DEPT 
[address] [facility] 
Demetrius Closs [place] [id number] [phone] Work/School Note Date: 12/7/2020 To Whom It May concern: 
 
 
Marvelyn Bloch was seen and treated today in the emergency room by the following provider(s): 
Attending Provider: Jojo Saez DO. Marvelyn Bloch is excused from work/school on 12/07/20. She is clear to return to work/school on 12/08/20.    
 
 
Sincerely, 
 
 
 
 
Nirmal Mayer DO

## 2021-01-28 PROBLEM — H52.229 REGULAR ASTIGMATISM: Status: ACTIVE | Noted: 2020-01-14

## 2021-03-29 ENCOUNTER — HOSPITAL ENCOUNTER (EMERGENCY)
Age: 26
Discharge: HOME | End: 2021-03-29
Attending: EMERGENCY MEDICINE
Payer: MEDICARE

## 2021-03-29 ENCOUNTER — APPOINTMENT (OUTPATIENT)
Dept: CT IMAGING | Age: 26
End: 2021-03-29
Attending: EMERGENCY MEDICINE
Payer: MEDICARE

## 2021-03-29 VITALS
SYSTOLIC BLOOD PRESSURE: 141 MMHG | OXYGEN SATURATION: 97 % | DIASTOLIC BLOOD PRESSURE: 68 MMHG | RESPIRATION RATE: 20 BRPM | HEIGHT: 64 IN | WEIGHT: 293 LBS | BODY MASS INDEX: 50.02 KG/M2 | HEART RATE: 88 BPM | TEMPERATURE: 98.6 F

## 2021-03-29 DIAGNOSIS — K42.9 UMBILICAL HERNIA WITHOUT OBSTRUCTION AND WITHOUT GANGRENE: Primary | ICD-10-CM

## 2021-03-29 LAB
ALBUMIN SERPL-MCNC: 3.2 G/DL (ref 3.5–5)
ALBUMIN/GLOB SERPL: 0.8 {RATIO} (ref 1.2–3.5)
ALP SERPL-CCNC: 120 U/L (ref 50–136)
ALT SERPL-CCNC: 28 U/L (ref 12–65)
ANION GAP SERPL CALC-SCNC: 4 MMOL/L (ref 7–16)
APPEARANCE UR: ABNORMAL
AST SERPL-CCNC: 13 U/L (ref 15–37)
BACTERIA URNS QL MICRO: ABNORMAL /HPF
BASOPHILS # BLD: 0.1 K/UL (ref 0–0.2)
BASOPHILS NFR BLD: 1 % (ref 0–2)
BILIRUB SERPL-MCNC: 0.2 MG/DL (ref 0.2–1.1)
BILIRUB UR QL: NEGATIVE
BUN SERPL-MCNC: 13 MG/DL (ref 6–23)
CALCIUM SERPL-MCNC: 9 MG/DL (ref 8.3–10.4)
CHLORIDE SERPL-SCNC: 105 MMOL/L (ref 98–107)
CO2 SERPL-SCNC: 31 MMOL/L (ref 21–32)
COLOR UR: YELLOW
CREAT SERPL-MCNC: 0.87 MG/DL (ref 0.6–1)
DIFFERENTIAL METHOD BLD: ABNORMAL
EOSINOPHIL # BLD: 0.4 K/UL (ref 0–0.8)
EOSINOPHIL NFR BLD: 4 % (ref 0.5–7.8)
EPI CELLS #/AREA URNS HPF: ABNORMAL /HPF
ERYTHROCYTE [DISTWIDTH] IN BLOOD BY AUTOMATED COUNT: 15 % (ref 11.9–14.6)
GLOBULIN SER CALC-MCNC: 4.2 G/DL (ref 2.3–3.5)
GLUCOSE SERPL-MCNC: 87 MG/DL (ref 65–100)
GLUCOSE UR STRIP.AUTO-MCNC: NEGATIVE MG/DL
HCT VFR BLD AUTO: 39.4 % (ref 35.8–46.3)
HGB BLD-MCNC: 12.1 G/DL (ref 11.7–15.4)
HGB UR QL STRIP: NEGATIVE
IMM GRANULOCYTES # BLD AUTO: 0.1 K/UL (ref 0–0.5)
IMM GRANULOCYTES NFR BLD AUTO: 1 % (ref 0–5)
KETONES UR QL STRIP.AUTO: NEGATIVE MG/DL
LEUKOCYTE ESTERASE UR QL STRIP.AUTO: ABNORMAL
LIPASE SERPL-CCNC: 93 U/L (ref 73–393)
LYMPHOCYTES # BLD: 3.2 K/UL (ref 0.5–4.6)
LYMPHOCYTES NFR BLD: 31 % (ref 13–44)
MCH RBC QN AUTO: 24 PG (ref 26.1–32.9)
MCHC RBC AUTO-ENTMCNC: 30.7 G/DL (ref 31.4–35)
MCV RBC AUTO: 78.2 FL (ref 79.6–97.8)
MONOCYTES # BLD: 0.7 K/UL (ref 0.1–1.3)
MONOCYTES NFR BLD: 7 % (ref 4–12)
NEUTS SEG # BLD: 6 K/UL (ref 1.7–8.2)
NEUTS SEG NFR BLD: 58 % (ref 43–78)
NITRITE UR QL STRIP.AUTO: NEGATIVE
NRBC # BLD: 0 K/UL (ref 0–0.2)
PH UR STRIP: 6.5 [PH] (ref 5–9)
PLATELET # BLD AUTO: 370 K/UL (ref 150–450)
PMV BLD AUTO: 9.2 FL (ref 9.4–12.3)
POTASSIUM SERPL-SCNC: 3.5 MMOL/L (ref 3.5–5.1)
PROT SERPL-MCNC: 7.4 G/DL (ref 6.3–8.2)
PROT UR STRIP-MCNC: NEGATIVE MG/DL
RBC # BLD AUTO: 5.04 M/UL (ref 4.05–5.2)
RBC #/AREA URNS HPF: ABNORMAL /HPF
SODIUM SERPL-SCNC: 140 MMOL/L (ref 136–145)
SP GR UR REFRACTOMETRY: 1.03 (ref 1–1.02)
UROBILINOGEN UR QL STRIP.AUTO: 1 EU/DL (ref 0.2–1)
WBC # BLD AUTO: 10.5 K/UL (ref 4.3–11.1)
WBC URNS QL MICRO: ABNORMAL /HPF

## 2021-03-29 PROCEDURE — 85025 COMPLETE CBC W/AUTO DIFF WBC: CPT

## 2021-03-29 PROCEDURE — 74011000636 HC RX REV CODE- 636: Performed by: EMERGENCY MEDICINE

## 2021-03-29 PROCEDURE — 83690 ASSAY OF LIPASE: CPT

## 2021-03-29 PROCEDURE — 99284 EMERGENCY DEPT VISIT MOD MDM: CPT

## 2021-03-29 PROCEDURE — 96374 THER/PROPH/DIAG INJ IV PUSH: CPT

## 2021-03-29 PROCEDURE — 74011000258 HC RX REV CODE- 258: Performed by: EMERGENCY MEDICINE

## 2021-03-29 PROCEDURE — 96375 TX/PRO/DX INJ NEW DRUG ADDON: CPT

## 2021-03-29 PROCEDURE — 74011000250 HC RX REV CODE- 250: Performed by: EMERGENCY MEDICINE

## 2021-03-29 PROCEDURE — 93005 ELECTROCARDIOGRAM TRACING: CPT | Performed by: EMERGENCY MEDICINE

## 2021-03-29 PROCEDURE — 80053 COMPREHEN METABOLIC PANEL: CPT

## 2021-03-29 PROCEDURE — 74011250636 HC RX REV CODE- 250/636: Performed by: EMERGENCY MEDICINE

## 2021-03-29 PROCEDURE — 99283 EMERGENCY DEPT VISIT LOW MDM: CPT

## 2021-03-29 PROCEDURE — 81001 URINALYSIS AUTO W/SCOPE: CPT

## 2021-03-29 PROCEDURE — 74177 CT ABD & PELVIS W/CONTRAST: CPT

## 2021-03-29 RX ORDER — SODIUM CHLORIDE 0.9 % (FLUSH) 0.9 %
10 SYRINGE (ML) INJECTION
Status: COMPLETED | OUTPATIENT
Start: 2021-03-29 | End: 2021-03-29

## 2021-03-29 RX ORDER — ONDANSETRON 2 MG/ML
4 INJECTION INTRAMUSCULAR; INTRAVENOUS ONCE
Status: COMPLETED | OUTPATIENT
Start: 2021-03-29 | End: 2021-03-29

## 2021-03-29 RX ORDER — MORPHINE SULFATE 4 MG/ML
4 INJECTION INTRAVENOUS ONCE
Status: COMPLETED | OUTPATIENT
Start: 2021-03-29 | End: 2021-03-29

## 2021-03-29 RX ORDER — OXYCODONE AND ACETAMINOPHEN 5; 325 MG/1; MG/1
1 TABLET ORAL
Qty: 12 TAB | Refills: 0 | Status: SHIPPED | OUTPATIENT
Start: 2021-03-29 | End: 2021-04-01

## 2021-03-29 RX ORDER — ONDANSETRON 4 MG/1
4 TABLET, ORALLY DISINTEGRATING ORAL
Qty: 12 TAB | Refills: 0 | Status: SHIPPED | OUTPATIENT
Start: 2021-03-29 | End: 2021-07-22 | Stop reason: SDUPTHER

## 2021-03-29 RX ORDER — VARENICLINE TARTRATE 1 MG/1
1 TABLET, FILM COATED ORAL
COMMUNITY
End: 2021-04-20 | Stop reason: SDUPTHER

## 2021-03-29 RX ADMIN — ONDANSETRON 4 MG: 2 INJECTION INTRAMUSCULAR; INTRAVENOUS at 22:19

## 2021-03-29 RX ADMIN — SODIUM CHLORIDE 1000 ML: 900 INJECTION, SOLUTION INTRAVENOUS at 22:19

## 2021-03-29 RX ADMIN — SODIUM CHLORIDE 100 ML: 900 INJECTION, SOLUTION INTRAVENOUS at 21:58

## 2021-03-29 RX ADMIN — MORPHINE SULFATE 4 MG: 4 INJECTION INTRAVENOUS at 22:20

## 2021-03-29 RX ADMIN — FAMOTIDINE 40 MG: 10 INJECTION, SOLUTION INTRAVENOUS at 22:19

## 2021-03-29 RX ADMIN — IOPAMIDOL 100 ML: 755 INJECTION, SOLUTION INTRAVENOUS at 21:58

## 2021-03-29 RX ADMIN — Medication 10 ML: at 21:58

## 2021-03-30 LAB
ATRIAL RATE: 104 BPM
CALCULATED P AXIS, ECG09: 50 DEGREES
CALCULATED R AXIS, ECG10: 54 DEGREES
CALCULATED T AXIS, ECG11: 45 DEGREES
DIAGNOSIS, 93000: NORMAL
P-R INTERVAL, ECG05: 138 MS
Q-T INTERVAL, ECG07: 326 MS
QRS DURATION, ECG06: 80 MS
QTC CALCULATION (BEZET), ECG08: 428 MS
VENTRICULAR RATE, ECG03: 104 BPM

## 2021-03-30 NOTE — ED NOTES
I have reviewed discharge instructions with the patient. The patient verbalized understanding. Patient left ED via Discharge Method: ambulatory to Home with self. Opportunity for questions and clarification provided. Patient given 2 scripts. To continue your aftercare when you leave the hospital, you may receive an automated call from our care team to check in on how you are doing. This is a free service and part of our promise to provide the best care and service to meet your aftercare needs.  If you have questions, or wish to unsubscribe from this service please call [phone]. Thank you for Choosing our [facility] Emergency Department.

## 2021-03-30 NOTE — ED PROVIDER NOTES
76-year-old female presenting for abdominal pain    The history is provided by the patient. Abdominal Pain   This is a new problem. The current episode started 3 to 5 hours ago. The problem occurs constantly. The problem has not changed since onset. The pain is associated with an unknown factor. The pain is located in the generalized abdominal region. The quality of the pain is sharp. The pain is at a severity of 5/10. The pain is moderate. Associated symptoms include chest pain. Pertinent negatives include no anorexia, no fever, no belching, no diarrhea, no flatus, no hematochezia, no melena, no nausea, no vomiting, no constipation, no dysuria, no frequency, no hematuria, no headaches, no arthralgias, no myalgias, no trauma, no testicular pain and no back pain. Nothing worsens the pain. The pain is relieved by nothing. Past workup includes no CT scan, no ultrasound, no surgery, no esophagogastroduodenoscopy, no UGI, no colonoscopy and no barium enema. The patient's surgical history non-contributory. Chest Pain (Angina)   Associated symptoms include abdominal pain. Pertinent negatives include no back pain, no fever, no headaches, no nausea and no vomiting.         Past Medical History:   Diagnosis Date    Allergic rhinitis, cause unspecified 10/10/2014    Asthma     Borderline personality disorder ([facility] [address] 10/10/2014    History of drug overdose     trazodone    History of iron deficiency     Learning disability        Past Surgical History:   Procedure Laterality Date    HX LAP CHOLECYSTECTOMY  12/11/2017    HX ORTHOPAEDIC      wrist         Family History:   Adopted: Yes   Problem Relation Age of Onset    No Known Problems Mother         Patient is adopted    No Known Problems Father         Patient is adopted       Social History     Socioeconomic History    Marital status: SINGLE     Spouse name: Not on file    Number of children: Not on file    Years of education: Not on file    Highest education level: Not on file   Occupational History    Not on file   Social Needs    Financial resource strain: Not on file    Food insecurity     Worry: Not on file     Inability: Not on file    Transportation needs     Medical: Not on file     Non-medical: Not on file   Tobacco Use    Smoking status: Current Every Day Smoker     Packs/day: 0.75     Years: 5.00     Pack years: 3.75    Smokeless tobacco: Never Used    Tobacco comment: Few cigarettes per day   Substance and Sexual Activity    Alcohol use: Yes     Alcohol/week: 0.0 standard drinks     Comment: Occass    Drug use: No     Comment: previously meth and Marijuana    Sexual activity: Not on file   Lifestyle    Physical activity     Days per week: Not on file     Minutes per session: Not on file    Stress: Not on file   Relationships    Social connections     Talks on phone: Not on file     Gets together: Not on file     Attends Scientologist service: Not on file     Active member of club or organization: Not on file     Attends meetings of clubs or organizations: Not on file     Relationship status: Not on file    Intimate partner violence     Fear of current or ex partner: Not on file     Emotionally abused: Not on file     Physically abused: Not on file     Forced sexual activity: Not on file   Other Topics Concern    Not on file   Social History Narrative    12th grader at [employer]. Moved here from [facility] to go to [address] [place] [address], now at Replaced by [facility]. Really has no pets. Currently drinks excessive caffeine intake. Patient is adopted. ALLERGIES: Acetaminophen; Ketorolac; Other plant, animal, environmental; and Pcn [penicillins]    Review of Systems   Constitutional: Negative for fever. Cardiovascular: Positive for chest pain. Gastrointestinal: Positive for abdominal pain. Negative for anorexia, constipation, diarrhea, flatus, hematochezia, melena, nausea and vomiting.    Genitourinary: Negative for dysuria, frequency, hematuria and testicular pain. Musculoskeletal: Negative for arthralgias, back pain and myalgias. Neurological: Negative for headaches. All other systems reviewed and are negative. Vitals:    03/29/21 2018   BP: (!) 153/73   Pulse: (!) 112   Resp: 20   Temp: 98.6 °F (37 °C)   SpO2: 95%   Weight: 147 kg (324 lb)   Height: 5' 4\" (1.626 m)            Physical Exam  Vitals signs and nursing note reviewed. Constitutional:       Appearance: She is well-developed. She is obese. HENT:      Head: Normocephalic and atraumatic. Eyes:      Conjunctiva/sclera: Conjunctivae normal.      Pupils: Pupils are equal, round, and reactive to light. Neck:      Musculoskeletal: Neck supple. Cardiovascular:      Rate and Rhythm: Regular rhythm. Tachycardia present. Pulmonary:      Effort: Pulmonary effort is normal.      Breath sounds: Normal breath sounds. Abdominal:      General: Bowel sounds are normal.      Palpations: Abdomen is soft. Musculoskeletal: Normal range of motion. Skin:     General: Skin is warm and dry. Neurological:      Mental Status: She is alert and oriented to person, place, and time. MDM  Number of Diagnoses or Management Options  Umbilical hernia without obstruction and without gangrene  Diagnosis management comments: 69-year-old female presenting for abdominal pain. Benign abdominal exam the patient is somewhat tachycardic.        Amount and/or Complexity of Data Reviewed  Clinical lab tests: ordered and reviewed (Results for orders placed or performed during the hospital encounter of 03/29/21  -CBC WITH AUTOMATED DIFF       Result                      Value             Ref Range           WBC                         10.5              4.3 - 11.1 K*       RBC                         5.04              4.05 - 5.2 M*       HGB                         12.1              11.7 - 15.4 *       HCT                         39.4              35.8 - 46.3 %       MCV 78.2 (L)          79.6 - 97.8 *       MCH                         24.0 (L)          26.1 - 32.9 *       MCHC                        30.7 (L)          31.4 - 35.0 *       RDW                         15.0 (H)          11.9 - 14.6 %       PLATELET                    370               150 - 450 K/*       MPV                         9.2 (L)           9.4 - 12.3 FL       ABSOLUTE NRBC               0.00              0.0 - 0.2 K/*       DF                          AUTOMATED                             NEUTROPHILS                 58                43 - 78 %           LYMPHOCYTES                 31                13 - 44 %           MONOCYTES                   7                 4.0 - 12.0 %        EOSINOPHILS                 4                 0.5 - 7.8 %         BASOPHILS                   1                 0.0 - 2.0 %         IMMATURE GRANULOCYTES       1                 0.0 - 5.0 %         ABS. NEUTROPHILS            6.0               1.7 - 8.2 K/*       ABS. LYMPHOCYTES            3.2               0.5 - 4.6 K/*       ABS. MONOCYTES              0.7               0.1 - 1.3 K/*       ABS. EOSINOPHILS            0.4               0.0 - 0.8 K/*       ABS. BASOPHILS              0.1               0.0 - 0.2 K/*       ABS. IMM.  GRANS.            0.1               0.0 - 0.5 K/*  -METABOLIC PANEL, COMPREHENSIVE       Result                      Value             Ref Range           Sodium                      140               136 - 145 mm*       Potassium                   3.5               3.5 - 5.1 mm*       Chloride                    105               98 - 107 mmo*       CO2                         31                21 - 32 mmol*       Anion gap                   4 (L)             7 - 16 mmol/L       Glucose                     87                65 - 100 mg/*       BUN                         13                6 - 23 MG/DL        Creatinine                  0.87              0.6 - 1.0 MG*       GFR est AA >60               >60 ml/min/1*       GFR est non-AA              >60               >60 ml/min/1*       Calcium                     9.0               8.3 - 10.4 M*       Bilirubin, total            0.2               0.2 - 1.1 MG*       ALT (SGPT)                  28                12 - 65 U/L         AST (SGOT)                  13 (L)            15 - 37 U/L         Alk.  phosphatase            120               50 - 136 U/L        Protein, total              7.4               6.3 - 8.2 g/*       Albumin                     3.2 (L)           3.5 - 5.0 g/*       Globulin                    4.2 (H)           2.3 - 3.5 g/*       A-G Ratio                   0.8 (L)           1.2 - 3.5      -LIPASE       Result                      Value             Ref Range           Lipase                      93                73 - 393 U/L   -URINALYSIS W/ RFLX MICROSCOPIC       Result                      Value             Ref Range           Color                       YELLOW                                Appearance                  CLOUDY                                Specific gravity            1.027 (H)         1.001 - 1.02*       pH (UA)                     6.5               5.0 - 9.0           Protein                     Negative          NEG mg/dL           Glucose                     Negative          mg/dL               Ketone                      Negative          NEG mg/dL           Bilirubin                   Negative          NEG                 Blood                       Negative          NEG                 Urobilinogen                1.0               0.2 - 1.0 EU*       Nitrites                    Negative          NEG                 Leukocyte Esterase          MODERATE (A)      NEG            -EKG, 12 LEAD, INITIAL       Result                      Value             Ref Range           Ventricular Rate            104               BPM                 Atrial Rate                 104               BPM P-R Interval                138               ms                  QRS Duration                80                ms                  Q-T Interval                326               ms                  QTC Calculation (Bezet)     428               ms                  Calculated P Axis           50                degrees             Calculated R Axis           54                degrees             Calculated T Axis           45                degrees             Diagnosis                                                     !! AGE AND GENDER SPECIFIC ECG ANALYSIS !! Sinus tachycardia   Otherwise normal ECG   When compared with ECG of 30-NOV-2020 20:53,   Premature ventricular complexes are no longer Present     )  Tests in the radiology section of CPT®: ordered and reviewed (Ct Abd Pelv W Cont    Result Date: 3/29/2021  CT abdomen and pelvis with contrast COMPARISON: None. INDICATION: Abdominal pain. TECHNIQUE: CT imaging was performed of the abdomen and pelvis following the uncomplicated administration of intravenous contrast (Isovue 370, 100 mL). Oral contrast was administered. Radiation dose reduction techniques were used for this study:  Our CT scanners use one or all of the following: Automated exposure control, adjustment of the mA and/or kVp according to patient's size, iterative reconstruction. FINDINGS: Visualized lung bases are unremarkable. Abdomen findings: The gallbladder is surgically absent. The liver, pancreas, spleen, adrenal glands, kidneys, and abdominal aorta are unremarkable. Stomach is normal in contour. Small bowel loops are normal caliber. No small bowel obstruction. There is no evidence of lymphadenopathy. Pelvic findings: There is a 5.5 cm fat-containing periumbilical hernia. No bowel herniation. The colon is normal in course and caliber. Appendix is normal. Urinary bladder is underdistended. Uterus is not well evaluated. There is no free air or free fluid. Surrounding bones are intact. 1. Fat-containing periumbilical hernia. No bowel herniation. 2. No acute findings are identified. Negative for colitis, diverticulitis, appendicitis or bowel obstruction. No hydronephrosis.     )  Tests in the medicine section of CPT®: ordered and reviewed  Independent visualization of images, tracings, or specimens: yes    Risk of Complications, Morbidity, and/or Mortality  Presenting problems: high  Diagnostic procedures: high  Management options: moderate  General comments: Patient is remained hemodynamically stable. Blood work is unremarkable. CT shows a fat-containing umbilical hernia which likely is the patient's source of discomfort. Discharging with some pain medications and referral to general surgery    I personally reviewed the patient's vital signs, laboratory tests, and/or radiological findings. I discussed these findings with the patient and their significance. I answered all questions and gave the patient clear return precautions.   The patient was discharged from the emergency department in stable condition        Patient Progress  Patient progress: improved         EKG    Date/Time: 3/29/2021 9:53 PM  Performed by: Tera Phoenix, MD  Authorized by: Tera Phoenix, MD     ECG reviewed by ED Physician in the absence of a cardiologist: yes    Previous ECG:     Previous ECG:  Unavailable  Interpretation:     Interpretation: abnormal    Rate:     ECG rate:  104    ECG rate assessment: tachycardic    Rhythm:     Rhythm: sinus rhythm    Ectopy:     Ectopy: none    QRS:     QRS axis:  Normal    QRS intervals:  Normal  Conduction:     Conduction: normal    ST segments:     ST segments:  Normal  T waves:     T waves: normal

## 2021-03-30 NOTE — DISCHARGE INSTRUCTIONS
Your blood work is reassuring but the CT does show umbilical hernia. This could be causing some pain.   Use the prescribed medications for symptom control and follow-up with the general surgeon in the next week or so for evaluation and discussion about possible repair

## 2021-04-13 ENCOUNTER — APPOINTMENT (OUTPATIENT)
Dept: GENERAL RADIOLOGY | Age: 26
End: 2021-04-13
Attending: EMERGENCY MEDICINE
Payer: MEDICARE

## 2021-04-13 ENCOUNTER — HOSPITAL ENCOUNTER (EMERGENCY)
Age: 26
Discharge: HOME | End: 2021-04-13
Attending: EMERGENCY MEDICINE
Payer: MEDICARE

## 2021-04-13 VITALS
BODY MASS INDEX: 50.02 KG/M2 | OXYGEN SATURATION: 95 % | HEART RATE: 123 BPM | TEMPERATURE: 99.9 F | HEIGHT: 64 IN | WEIGHT: 293 LBS | RESPIRATION RATE: 20 BRPM

## 2021-04-13 DIAGNOSIS — S39.012A BACK STRAIN, INITIAL ENCOUNTER: Primary | ICD-10-CM

## 2021-04-13 PROCEDURE — 72100 X-RAY EXAM L-S SPINE 2/3 VWS: CPT

## 2021-04-13 PROCEDURE — 99282 EMERGENCY DEPT VISIT SF MDM: CPT

## 2021-04-13 RX ORDER — METHOCARBAMOL 750 MG/1
750 TABLET, FILM COATED ORAL 4 TIMES DAILY
Qty: 30 TAB | Refills: 0 | Status: SHIPPED | OUTPATIENT
Start: 2021-04-13 | End: 2021-04-21

## 2021-04-14 NOTE — DISCHARGE INSTRUCTIONS
Follow-up with your doctor as needed. Return to the emergency department if your symptoms worsen. MEDICINE DAILY PROGRESS NOTE  NAME: Joseline Parnell  : 1978  MRN: [id number]     LOS: 9 days     PROVIDER OF SERVICE: Luis F Jalloh MD    Chief Complaint: Alcoholic cirrhosis of liver with ascites    Subjective:     Interval History:  History taken from: patient chart RN   Patient again alert this morning.  Patient had one episode of emesis.  Discussed again increasing nutrition.    Review of Systems:   Review of Systems   Constitutional: Positive for activity change and fatigue. Negative for appetite change and fever.   HENT: Negative for ear pain and sore throat.    Eyes: Negative for pain and visual disturbance.   Respiratory: Negative for cough and shortness of breath.    Cardiovascular: Negative for chest pain and palpitations.   Gastrointestinal: Positive for abdominal distention. Negative for abdominal pain and nausea.   Endocrine: Negative for cold intolerance and heat intolerance.   Genitourinary: Negative for difficulty urinating and dysuria.   Musculoskeletal: Negative for arthralgias and gait problem.   Skin: Negative for color change and rash.   Neurological: Positive for weakness. Negative for dizziness and headaches.   Hematological: Negative for adenopathy. Does not bruise/bleed easily.   Psychiatric/Behavioral: Positive for confusion. Negative for agitation and sleep disturbance.       Objective:     Vital Signs  Vitals:    17 0402 17 0502 17 0602 17 0629   BP: 113/85 113/75 99/73 102/76   BP Location: Right arm Right arm Right arm    Patient Position: Lying Lying Lying    Pulse: 113 115 117 100   Resp: 19 18 19    Temp: 98.5 °F (36.9 °C)      TempSrc: Oral      SpO2: 100% 100% 92%    Weight:   103 lb 13.4 oz (47.1 kg)    Height:           Physical Exam  Physical Exam   Constitutional: She is oriented to person, place, and time. She appears well-developed. She appears cachectic. No distress.   HENT:   Head: Normocephalic and atraumatic.   Right Ear: External ear normal.    Left Ear: External ear normal.   Nose: Nose normal.   Eyes: Pupils are equal, round, and reactive to light.   Neck: Normal range of motion. Neck supple.   Cardiovascular: Normal rate, regular rhythm and normal heart sounds.  Exam reveals no gallop and no friction rub.    No murmur heard.  Pulmonary/Chest: Effort normal and breath sounds normal. No respiratory distress. She has no wheezes. She has no rales. She exhibits no tenderness.   Abdominal: Soft. Bowel sounds are normal. She exhibits distension. She exhibits no mass. There is no tenderness. There is no rebound and no guarding.   Neurological: She is alert and oriented to person, place, and time.   Skin: She is not diaphoretic.       Medication Review    Current Facility-Administered Medications:   •  acetaminophen (TYLENOL) tablet 650 mg, 650 mg, Oral, Q4H PRN, Mo Garcia MD  •  bacitracin ointment, , Topical, Q12H, Luis F Jalloh MD  •  bisacodyl (DULCOLAX) suppository 10 mg, 10 mg, Rectal, Daily PRN, Mo Garcia MD  •  cyclobenzaprine (FLEXERIL) tablet 10 mg, 10 mg, Oral, BID, Mo Garcia MD, 10 mg at 08/11/17 0854  •  dextrose (D50W) solution 50 mL, 50 mL, Intravenous, Q1H PRN, Diogo Dickerson MD, 50 mL at 08/02/17 0034  •  docusate sodium (COLACE) capsule 100 mg, 100 mg, Oral, BID, Mo Garcia MD, 100 mg at 08/09/17 1734  •  famotidine (PEPCID) tablet 20 mg, 20 mg, Oral, BID, Mo Garcia MD, 20 mg at 08/11/17 0854  •  furosemide (LASIX) injection 20 mg, 20 mg, Intravenous, Q12H, Roc Barnes MD, 20 mg at 08/11/17 0854  •  hydrALAZINE (APRESOLINE) injection 10 mg, 10 mg, Intravenous, Q6H PRN, Mo Garcia MD  •  ibuprofen (ADVIL,MOTRIN) tablet 600 mg, 600 mg, Oral, Q6H PRN, Lobo Gu MD  •  loperamide (IMODIUM) capsule 2 mg, 2 mg, Oral, TID PRN, Roc Barnes MD, 2 mg at 08/10/17 2002  •  LORazepam (ATIVAN) tablet 0.5 mg, 0.5 mg, Oral, Q2H PRN **OR** LORazepam (ATIVAN) injection  0.5 mg, 0.5 mg, Intravenous, Q2H PRN **OR** LORazepam (ATIVAN) tablet 1 mg, 1 mg, Oral, Q1H PRN **OR** LORazepam (ATIVAN) injection 1 mg, 1 mg, Intravenous, Q1H PRN **OR** LORazepam (ATIVAN) injection 1 mg, 1 mg, Intravenous, Q15 Min PRN **OR** LORazepam (ATIVAN) injection 1 mg, 1 mg, Intramuscular, Q15 Min PRN **OR** LORazepam (ATIVAN) tablet 2 mg, 2 mg, Oral, Q1H PRN **OR** LORazepam (ATIVAN) injection 2 mg, 2 mg, Intravenous, Q1H PRN, Lobo Gu MD, 2 mg at 08/06/17 0844  •  LORazepam (ATIVAN) injection 1 mg, 1 mg, Intravenous, Q6H PRN, Mo Garcia MD, 1 mg at 08/08/17 2222  •  midodrine (PROAMATINE) tablet 10 mg, 10 mg, Oral, TID AC, Roc Barnes MD, 10 mg at 08/11/17 0630  •  Morphine sulfate (PF) injection 1 mg, 1 mg, Intravenous, Q4H PRN, Roc Barnes MD, 1 mg at 08/11/17 0854  •  nicotine (NICODERM CQ) 21 MG/24HR patch 1 patch, 1 patch, Transdermal, Q24H, Mo Garcia MD, 1 patch at 08/11/17 0856  •  norepinephrine (LEVOPHED) 8,000 mcg in sodium chloride 0.9 % 250 mL (32 mcg/mL) infusion, 0.02-0.3 mcg/kg/min, Intravenous, Titrated, Lobo Gu MD  •  ondansetron (ZOFRAN) tablet 4 mg, 4 mg, Oral, Q6H PRN **OR** ondansetron ODT (ZOFRAN-ODT) disintegrating tablet 4 mg, 4 mg, Oral, Q6H PRN **OR** ondansetron (ZOFRAN) injection 4 mg, 4 mg, Intravenous, Q6H PRN, Mo Garcia MD, 4 mg at 08/10/17 1838  •  pantoprazole (PROTONIX) EC tablet 40 mg, 40 mg, Oral, QAM, Mo Garcia MD, 40 mg at 08/11/17 0629  •  potassium chloride (MICRO-K) CR capsule 40 mEq, 40 mEq, Oral, PRN, 40 mEq at 08/05/17 1755 **OR** potassium chloride (KLOR-CON) packet 40 mEq, 40 mEq, Oral, PRN **OR** potassium chloride 10 mEq in 100 mL IVPB, 10 mEq, Intravenous, Q1H PRN, Mo Garcia MD, Last Rate: 100 mL/hr at 08/07/17 1141, 10 mEq at 08/07/17 1141  •  potassium chloride 10 mEq in 100 mL IVPB, 10 mEq, Intravenous, Q1H PRN, Lobo Gu MD, Last Rate: 100 mL/hr at 08/07/17  1039, 10 mEq at 08/07/17 1039  •  potassium chloride 20 mEq in 50 mL IVPB, 20 mEq, Intravenous, Q1H PRN, Roc Barnes MD, Last Rate: 50 mL/hr at 08/09/17 1038, 20 mEq at 08/09/17 1038  •  propranolol (INDERAL) tablet 20 mg, 20 mg, Oral, Q8H, Lobo Gu MD, 20 mg at 08/11/17 0629  •  sodium chloride 0.9 % flush 1-10 mL, 1-10 mL, Intravenous, PRN, Mo Garcia MD, 10 mL at 08/10/17 0926  •  Insert peripheral IV, , , Once **AND** sodium chloride 0.9 % flush 10 mL, 10 mL, Intravenous, PRN, Diogo Dickerson MD, 10 mL at 08/10/17 2010  •  sodium chloride tablet 1 g, 1 g, Oral, TID With Meals, Roc Barnes MD, 1 g at 08/11/17 0856  •  spironolactone (ALDACTONE) tablet 25 mg, 25 mg, Oral, Daily, Roc Barnes MD, 25 mg at 08/11/17 0854  •  traMADol (ULTRAM) tablet 50 mg, 50 mg, Oral, Q8H PRN, Roc Barnes MD, 50 mg at 08/07/17 2012  •  Vitamin D3 50,000 Units, 50,000 Units, Oral, Once per day on Mon Thu, Roc Barnes MD, 50,000 Units at 08/10/17 0916  •  zolpidem (AMBIEN) tablet 5 mg, 5 mg, Oral, Nightly PRN, Roc Barnes MD, 5 mg at 08/07/17 2133     Diagnostic Data    Lab Results (last 24 hours)     Procedure Component Value Units Date/Time    CBC Auto Differential [[id number]]  (Abnormal) Collected:  08/10/17 1839    Specimen:  Blood Updated:  08/10/17 1913     WBC 9.21 10*3/mm3      RBC 2.95 (L) 10*6/mm3      Hemoglobin 8.5 (L) g/dL      Hematocrit 25.8 (L) %      MCV 87.5 fL      MCH 28.8 pg      MCHC 32.9 g/dL      RDW 15.1 (H) %      RDW-SD 47.7 (H) fl      MPV 13.1 (H) fL      Platelets 31 (L) 10*3/mm3      Neutrophil % 89.7 (H) %      Lymphocyte % 6.4 (L) %      Monocyte % 2.8 %      Eosinophil % 0.1 %      Basophil % 0.2 %      Immature Grans % 0.8 (H) %      Neutrophils, Absolute 8.26 10*3/mm3      Lymphocytes, Absolute 0.59 (L) 10*3/mm3      Monocytes, Absolute 0.26 10*3/mm3      Eosinophils, Absolute 0.01 10*3/mm3      Basophils, Absolute 0.02 10*3/mm3      Immature Grans, Absolute 0.07 (H)  10*3/mm3      nRBC 0.3 (H) /100 WBC     CBC & Differential [[id number]] Collected:  08/10/17 1839    Specimen:  Blood Updated:  08/10/17 2053    Narrative:       The following orders were created for panel order CBC & Differential.  Procedure                               Abnormality         Status                     ---------                               -----------         ------                     Scan Slide[[id number]]                                       Final result               CBC Auto Differential[[id number]]        Abnormal            Final result                 Please view results for these tests on the individual orders.    Scan Slide [[id number]] Collected:  08/10/17 1839    Specimen:  Blood Updated:  08/10/17 2053     Anisocytosis Slight/1+     Hypochromia Slight/1+     Poikilocytes Slight/1+     WBC Morphology Normal     Platelet Estimate Decreased    Magnesium [[id number]]  (Normal) Collected:  08/11/17 0406    Specimen:  Blood Updated:  08/11/17 0444     Magnesium 1.8 mg/dL     Comprehensive Metabolic Panel [[id number]]  (Abnormal) Collected:  08/11/17 0406    Specimen:  Blood Updated:  08/11/17 0449     Glucose 68 mg/dL      BUN 36 (H) mg/dL      Creatinine 0.93 mg/dL      Sodium 129 (L) mmol/L      Potassium 4.0 mmol/L      Chloride 92 (L) mmol/L      CO2 27.0 mmol/L      Calcium 7.6 (L) mg/dL      Total Protein 4.8 (L) g/dL      Albumin 2.00 (L) g/dL      ALT (SGPT) 42 U/L      AST (SGOT) 16 U/L      Alkaline Phosphatase 99 U/L      Total Bilirubin 2.6 (H) mg/dL      eGFR   Amer 82 mL/min/1.73      Globulin 2.8 gm/dL      A/G Ratio 0.7 (L) g/dL      BUN/Creatinine Ratio 38.7 (H)     Anion Gap 10.0 mmol/L     CBC (No Diff) [[id number]]  (Abnormal) Collected:  08/11/17 0406    Specimen:  Blood Updated:  08/11/17 0503     WBC 10.68 (H) 10*3/mm3      RBC 2.84 (L) 10*6/mm3      Hemoglobin 8.4 (L) g/dL      Hematocrit 24.7 (L) %      MCV 87.0 fL      MCH 29.6 pg      MCHC 34.0 g/dL      RDW 15.1  (H) %      RDW-SD 47.6 (H) fl      MPV 12.7 (H) fL      Platelets 30 (L) 10*3/mm3         I reviewed the patient's new clinical results.    Assessment/Plan:     Active Hospital Problems (** Indicates Principal Problem)    Diagnosis Date Noted   • **Alcoholic cirrhosis of liver with ascites [K70.31] 07/19/2017 08/11/17.  Stable.  Mentation improved.  Severe malnutrition.  Not a candidate for PEG tube.    CIWA.  GI following.  Await recommendations.     • Hypomagnesemia [E83.42] 08/09/2017     Replace.  Recheck     • Thrombocytopenia [D69.6] 08/09/2017 08/11/17.  Platelets stable.  Monitor.    Results from last 7 days  Lab Units 08/11/17  0406 08/10/17  1839 08/09/17  0228 08/08/17  0435 08/08/17  0001   PLATELETS 10*3/mm3 30* 31* 27* 31* 34*     08/10/17.  CBC pending today.  Following platelets.    Secondary to cirrhosis.  Following platelets.  Been stable around 30.      Results from last 7 days  Lab Units 08/09/17  0228 08/08/17  0435 08/08/17  0001 08/07/17  1607 08/07/17  1220 08/07/17  0526 08/06/17  1831   PLATELETS 10*3/mm3 27* 31* 34* 35* 31* 42* 33*          • Hypokalemia [E87.6] 08/07/2017     08/10/17.  At goal today.  Monitor.    08/09/17.  Replace today.  Magnesium low today.  Replace both.  Recheck.  Monitor.    08/08/17.  At goal today.  Monitor.    Replace.  Recheck.  Magnesium at 1.7 mg/dL (Normal)     • Traumatic pneumothorax [S27.0XXA] 08/07/2017 08/11/17.  Decreasing per x-ray.  X-ray ordered for tomorrow morning.    08/10/17.  Persistent but small.  X-ray from today pending.    08/09/17.  X-ray pending.  Follow x-ray.  No respiratory distress.    08/08/17.  Improving.  Almost resolved per x-ray.  Will continue to monitor.    Imaging Results (last 24 hours)     Procedure Component Value Units Date/Time    XR Chest 1 View [[id number]] Collected:  08/07/17 1823     Updated:  08/07/17 1835    Narrative:         EXAM:         Radiograph(s), Chest   VIEWS:   Portable ; 1       DATE/TIME:  8/7/2017 6:32 PM CDT               INDICATION:     Pneumothorax follow-up      COMPARISON:   CXR: 8/7/17 at 04:14 hours          FINDINGS:           - lines/tubes:     Right approach central venous catheter,  unchanged.     - cardiac:          size within normal limits         - mediastinum:  contour within normal limits         - lungs:          no focal air space process, pulmonary  interstitial edema, nodule(s)/mass             - pleura:          Previously described right-sided pneumothorax  has decreased in size with a small residual pneumothorax  remaining inferiorly/laterally on the right.                  - osseous:          unremarkable for age                  - misc.:        Impression:       CONCLUSION:        1. Interval decrease in size of the right pneumothorax.    Maintain short interval radiographic follow-up to document  resolution.                      Electronically signed by:  SHAR Melendrez MD  8/7/2017 6:34 PM  CDT Workstation: [id number]    XR Chest 1 View [[id number]] Collected:  08/08/17 0816     Updated:  08/08/17 0837    Narrative:       Chest single view.     CLINICAL INDICATION: Shortness of breath. Right-sided  pneumothorax, follow-up.    COMPARISON: Chest x-ray August 7, 2017.    FINDINGS: Cardiac silhouette is normal in size. Pulmonary  vascularity is unremarkable.     Small right-sided pneumothorax less than 5%.     0.9 cm of space between the pleural reflection and the chest wall  at the right lung base. Similar size to prior examination August 7, 2017.   Clearing of discoid infiltrative changes left lung base. Partial  clearing of discoid infiltrative, atelectatic changes right lung  base.      Impression:       CONCLUSION: Small right-sided pneumothorax unchanged since prior  examination. Clearing of discoid atelectatic changes at left lung  base. Partial clearing of discoid atelectatic changes right lung  base.    Electronically signed by:  Nickolas Gonzales MD  8/8/2017 8:36 AM  CDT  Workstation: [id number]            Stable on x-ray.  Dr. Wang discussed case with CT surgery yesterday.  Monitoring.  Chest tube as needed with desaturation or worsening pneumothorax on x-ray.     • Hyponatremia [E87.1] 08/07/2017     Nephrology following.  Salt tab ordered.     • Ascites due to alcoholic cirrhosis [K70.31] 07/19/2017     GI following.  Monitor.  Paracentesis as needed.     • Moderate episode of recurrent major depressive disorder [F33.1] 07/19/2017   • Chronic midline low back pain [M54.5, G89.29] 01/17/2017   • Chronic pelvic pain in female [R10.2, G89.29] 01/17/2017      Resolved Hospital Problems    Diagnosis Date Noted Date Resolved   No resolved problems to display.   Again alert and oriented today.  Increase nutrition as possible.    Will monitor patient's hospital course and adjust treatment as hospital course dictates.    DVT prophylaxis: SCD/TEDs  Code status is Full Code    Plan for disposition:Unknown      Time:           This document has been electronically signed by Luis F Jalloh MD on August 11, 2017 9:16 AM

## 2021-04-14 NOTE — ED PROVIDER NOTES
Patient has a significant past medical history of asthma and borderline personality disorder complaining of low back pain. She states she woke up with it yesterday. She states prior to that she had taken a long trip in the car, states she just drove back from [place]. She denies any trauma or obvious precipitating event. She describes the pain as achy pain that radiates to her buttocks on occasion. She denies any lower extremity weakness, denies any symptoms of saddle anesthesia. Past Medical History:   Diagnosis Date    Allergic rhinitis, cause unspecified 10/10/2014    Asthma     Borderline personality disorder ([address] 10/10/2014    History of drug overdose     trazodone    History of iron deficiency     Learning disability        Past Surgical History:   Procedure Laterality Date    HX LAP CHOLECYSTECTOMY  12/11/2017    HX ORTHOPAEDIC      wrist         Family History:   Adopted: Yes   Problem Relation Age of Onset    No Known Problems Mother         Patient is adopted    No Known Problems Father         Patient is adopted       Social History     Socioeconomic History    Marital status: SINGLE     Spouse name: Not on file    Number of children: Not on file    Years of education: Not on file    Highest education level: Not on file   Occupational History    Not on file   Social Needs    Financial resource strain: Not on file    Food insecurity     Worry: Not on file     Inability: Not on file    Transportation needs     Medical: Not on file     Non-medical: Not on file   Tobacco Use    Smoking status: Current Every Day Smoker     Packs/day: 0.75     Years: 5.00     Pack years: 3.75    Smokeless tobacco: Never Used    Tobacco comment: Few cigarettes per day   Substance and Sexual Activity    Alcohol use:  Yes     Alcohol/week: 0.0 standard drinks     Comment: Occass    Drug use: No     Comment: previously meth and Marijuana    Sexual activity: Not on file   Lifestyle    Physical activity     Days per week: Not on file     Minutes per session: Not on file    Stress: Not on file   Relationships    Social connections     Talks on phone: Not on file     Gets together: Not on file     Attends Baptism service: Not on file     Active member of club or organization: Not on file     Attends meetings of clubs or organizations: Not on file     Relationship status: Not on file    Intimate partner violence     Fear of current or ex partner: Not on file     Emotionally abused: Not on file     Physically abused: Not on file     Forced sexual activity: Not on file   Other Topics Concern    Not on file   Social History Narrative    10th grader at [employer]. Moved here from [facility] to go to [address] [place] [address], [facility]. Really has no pets. Currently drinks excessive caffeine intake. Patient is adopted. ALLERGIES: Acetaminophen; Ketorolac; Other plant, animal, environmental; Pcn [penicillins]; Shrimp; and Tuna oil    Review of Systems   Constitutional: Negative for chills and fever. Gastrointestinal: Negative for nausea and vomiting. All other systems reviewed and are negative. Vitals:    04/13/21 1857   Pulse: (!) 123   Resp: 20   Temp: 99.9 °F (37.7 °C)   SpO2: 95%   Weight: 150.1 kg (331 lb)   Height: 5' 4\" (1.626 m)            Physical Exam  Vitals signs and nursing note reviewed. Constitutional:       Appearance: She is well-developed. HENT:      Head: Normocephalic and atraumatic. Eyes:      Conjunctiva/sclera: Conjunctivae normal.      Pupils: Pupils are equal, round, and reactive to light. Neck:      Musculoskeletal: Normal range of motion and neck supple. Pulmonary:      Effort: Pulmonary effort is normal. No respiratory distress. Musculoskeletal:         General: Tenderness present. Back:       Comments: Diffuse mild tenderness to palpation lower back as indicated   Skin:     General: Skin is warm and dry.    Neurological: Mental Status: She is alert and oriented to person, place, and time. Psychiatric:         Behavior: Behavior normal.          MDM  Number of Diagnoses or Management Options  Back strain, initial encounter: new and does not require workup  Diagnosis management comments: 8:27 PM discussed results with patient, unremarkable x-rays. She has a primary doctor she can follow-up with. She will be treated symptomatically.        Amount and/or Complexity of Data Reviewed  Tests in the radiology section of CPT®: ordered and reviewed    Risk of Complications, Morbidity, and/or Mortality  Presenting problems: moderate  Diagnostic procedures: low  Management options: moderate    Patient Progress  Patient progress: stable         Procedures

## 2021-04-20 PROBLEM — J30.1 NON-SEASONAL ALLERGIC RHINITIS DUE TO POLLEN: Status: ACTIVE | Noted: 2021-04-20

## 2021-05-03 ENCOUNTER — HOSPITAL ENCOUNTER (EMERGENCY)
Age: 26
Discharge: HOME | End: 2021-05-03

## 2021-06-02 ENCOUNTER — HOSPITAL ENCOUNTER (OUTPATIENT)
Dept: ULTRASOUND IMAGING | Age: 26
Discharge: HOME | End: 2021-06-02
Attending: PHYSICIAN ASSISTANT
Payer: MEDICARE

## 2021-06-02 DIAGNOSIS — R19.7 DIARRHEA, UNSPECIFIED TYPE: ICD-10-CM

## 2021-06-02 DIAGNOSIS — R11.2 NAUSEA AND VOMITING, INTRACTABILITY OF VOMITING NOT SPECIFIED, UNSPECIFIED VOMITING TYPE: ICD-10-CM

## 2021-06-02 PROCEDURE — 76705 ECHO EXAM OF ABDOMEN: CPT

## 2021-07-30 ENCOUNTER — HOSPITAL ENCOUNTER (OUTPATIENT)
Dept: ULTRASOUND IMAGING | Age: 26
Discharge: HOME | End: 2021-07-30
Attending: PHYSICIAN ASSISTANT
Payer: MEDICARE

## 2021-07-30 DIAGNOSIS — N92.6 IRREGULAR MENSES: ICD-10-CM

## 2021-07-30 PROCEDURE — 76830 TRANSVAGINAL US NON-OB: CPT

## 2022-03-18 PROBLEM — H52.229 REGULAR ASTIGMATISM: Status: ACTIVE | Noted: 2020-01-14

## 2022-03-18 PROBLEM — Z90.49 HISTORY OF CHOLECYSTECTOMY: Status: ACTIVE | Noted: 2019-06-19

## 2022-03-18 PROBLEM — K21.00 GASTROESOPHAGEAL REFLUX DISEASE WITH ESOPHAGITIS: Status: ACTIVE | Noted: 2017-06-21

## 2022-03-18 PROBLEM — F31.9 BIPOLAR DISORDER (HCC): Status: ACTIVE | Noted: 2018-03-30

## 2022-03-18 PROBLEM — Z72.821 POOR SLEEP HYGIENE: Status: ACTIVE | Noted: 2018-07-10

## 2022-03-19 PROBLEM — J45.909 ASTHMA: Status: ACTIVE | Noted: 2018-03-30

## 2022-03-19 PROBLEM — K76.0 FATTY LIVER DISEASE, NONALCOHOLIC: Status: ACTIVE | Noted: 2017-12-18

## 2022-03-19 PROBLEM — J30.1 NON-SEASONAL ALLERGIC RHINITIS DUE TO POLLEN: Status: ACTIVE | Noted: 2021-04-20

## 2022-03-19 PROBLEM — F63.9 IMPULSE CONTROL DISORDER IN ADULT: Status: ACTIVE | Noted: 2017-10-05

## 2022-03-19 PROBLEM — Z99.89 OSA ON CPAP: Status: ACTIVE | Noted: 2018-07-05

## 2022-03-19 PROBLEM — F17.210 CIGARETTE SMOKER ONE HALF PACK A DAY OR LESS: Status: ACTIVE | Noted: 2017-06-21

## 2022-03-19 PROBLEM — Z87.828 HISTORY OF MOTOR VEHICLE ACCIDENT: Status: ACTIVE | Noted: 2018-03-20

## 2022-03-19 PROBLEM — F41.9 ANXIETY: Status: ACTIVE | Noted: 2017-12-22

## 2022-03-19 PROBLEM — R27.8: Status: ACTIVE | Noted: 2017-12-07

## 2022-03-19 PROBLEM — G47.33 OSA ON CPAP: Status: ACTIVE | Noted: 2018-07-05

## 2022-03-19 PROBLEM — Z86.19 HISTORY OF HELICOBACTER INFECTION: Status: ACTIVE | Noted: 2019-08-06

## 2022-03-20 PROBLEM — E66.01 CLASS 3 SEVERE OBESITY DUE TO EXCESS CALORIES WITH SERIOUS COMORBIDITY AND BODY MASS INDEX (BMI) OF 45.0 TO 49.9 IN ADULT (HCC): Status: ACTIVE | Noted: 2018-07-10

## 2022-03-20 PROBLEM — L84 HEEL CALLUS: Status: ACTIVE | Noted: 2018-09-14

## 2022-03-20 PROBLEM — E66.813 CLASS 3 SEVERE OBESITY DUE TO EXCESS CALORIES WITH SERIOUS COMORBIDITY AND BODY MASS INDEX (BMI) OF 45.0 TO 49.9 IN ADULT (HCC): Status: ACTIVE | Noted: 2018-07-10

## 2023-05-04 PROBLEM — L03.211 FACIAL CELLULITIS: Status: ACTIVE | Noted: 2023-04-01
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
## 2025-05-08 ENCOUNTER — HOSPITAL ENCOUNTER (EMERGENCY)
Age: 30
Discharge: HOME | End: 2025-05-08
Payer: MEDICAID

## 2025-05-08 VITALS
SYSTOLIC BLOOD PRESSURE: 135 MMHG | BODY MASS INDEX: 50.02 KG/M2 | RESPIRATION RATE: 18 BRPM | WEIGHT: 293 LBS | TEMPERATURE: 98.4 F | HEIGHT: 64 IN | OXYGEN SATURATION: 99 % | DIASTOLIC BLOOD PRESSURE: 86 MMHG | HEART RATE: 93 BPM

## 2025-05-08 DIAGNOSIS — L03.311 CELLULITIS OF ABDOMINAL WALL: Primary | ICD-10-CM

## 2025-05-08 LAB
ALBUMIN SERPL-MCNC: 3.9 G/DL (ref 3.5–5)
ALBUMIN/GLOB SERPL: 0.8 (ref 1–1.9)
ALP SERPL-CCNC: 108 U/L (ref 35–104)
ALT SERPL-CCNC: 15 U/L (ref 8–45)
ANION GAP SERPL CALC-SCNC: 11 MMOL/L (ref 7–16)
AST SERPL-CCNC: 16 U/L (ref 15–37)
BASOPHILS # BLD: 0.04 K/UL (ref 0–0.2)
BASOPHILS NFR BLD: 0.5 % (ref 0–2)
BILIRUB SERPL-MCNC: 0.3 MG/DL (ref 0–1.2)
BUN SERPL-MCNC: 12 MG/DL (ref 6–23)
CALCIUM SERPL-MCNC: 9.6 MG/DL (ref 8.8–10.2)
CHLORIDE SERPL-SCNC: 103 MMOL/L (ref 98–107)
CO2 SERPL-SCNC: 26 MMOL/L (ref 20–29)
CREAT SERPL-MCNC: 0.65 MG/DL (ref 0.6–1.1)
DIFFERENTIAL METHOD BLD: ABNORMAL
EOSINOPHIL # BLD: 0.31 K/UL (ref 0–0.8)
EOSINOPHIL NFR BLD: 3.5 % (ref 0.5–7.8)
ERYTHROCYTE [DISTWIDTH] IN BLOOD BY AUTOMATED COUNT: 13.3 % (ref 11.9–14.6)
GLOBULIN SER CALC-MCNC: 4.8 G/DL (ref 2.3–3.5)
GLUCOSE SERPL-MCNC: 97 MG/DL (ref 70–99)
HCT VFR BLD AUTO: 42.1 % (ref 35.8–46.3)
HGB BLD-MCNC: 13.6 G/DL (ref 11.7–15.4)
IMM GRANULOCYTES # BLD AUTO: 0.05 K/UL (ref 0–0.5)
IMM GRANULOCYTES NFR BLD AUTO: 0.6 % (ref 0–5)
LACTATE SERPL-SCNC: 0.9 MMOL/L (ref 0.5–2)
LYMPHOCYTES # BLD: 2.63 K/UL (ref 0.5–4.6)
LYMPHOCYTES NFR BLD: 29.7 % (ref 13–44)
MCH RBC QN AUTO: 27.8 PG (ref 26.1–32.9)
MCHC RBC AUTO-ENTMCNC: 32.3 G/DL (ref 31.4–35)
MCV RBC AUTO: 85.9 FL (ref 82–102)
MONOCYTES # BLD: 0.35 K/UL (ref 0.1–1.3)
MONOCYTES NFR BLD: 4 % (ref 4–12)
NEUTS SEG # BLD: 5.47 K/UL (ref 1.7–8.2)
NEUTS SEG NFR BLD: 61.7 % (ref 43–78)
NRBC # BLD: 0 K/UL (ref 0–0.2)
PLATELET # BLD AUTO: 398 K/UL (ref 150–450)
PMV BLD AUTO: 9.3 FL (ref 9.4–12.3)
POTASSIUM SERPL-SCNC: 3.5 MMOL/L (ref 3.5–5.1)
PROCALCITONIN SERPL-MCNC: 0.03 NG/ML (ref 0–0.1)
PROT SERPL-MCNC: 8.7 G/DL (ref 6.3–8.2)
RBC # BLD AUTO: 4.9 M/UL (ref 4.05–5.2)
SODIUM SERPL-SCNC: 140 MMOL/L (ref 136–145)
WBC # BLD AUTO: 8.9 K/UL (ref 4.3–11.1)

## 2025-05-08 PROCEDURE — 80053 COMPREHEN METABOLIC PANEL: CPT

## 2025-05-08 PROCEDURE — 96365 THER/PROPH/DIAG IV INF INIT: CPT

## 2025-05-08 PROCEDURE — 6360000002 HC RX W HCPCS

## 2025-05-08 PROCEDURE — 87040 BLOOD CULTURE FOR BACTERIA: CPT

## 2025-05-08 PROCEDURE — 96375 TX/PRO/DX INJ NEW DRUG ADDON: CPT

## 2025-05-08 PROCEDURE — 84145 PROCALCITONIN (PCT): CPT

## 2025-05-08 PROCEDURE — 83605 ASSAY OF LACTIC ACID: CPT

## 2025-05-08 PROCEDURE — 99284 EMERGENCY DEPT VISIT MOD MDM: CPT

## 2025-05-08 PROCEDURE — 85025 COMPLETE CBC W/AUTO DIFF WBC: CPT

## 2025-05-08 RX ORDER — KETOROLAC TROMETHAMINE 15 MG/ML
15 INJECTION, SOLUTION INTRAMUSCULAR; INTRAVENOUS
Status: COMPLETED | OUTPATIENT
Start: 2025-05-08 | End: 2025-05-08

## 2025-05-08 RX ORDER — CLINDAMYCIN HYDROCHLORIDE 300 MG/1
300 CAPSULE ORAL 3 TIMES DAILY
Qty: 21 CAPSULE | Refills: 0 | Status: SHIPPED | OUTPATIENT
Start: 2025-05-08 | End: 2025-05-15

## 2025-05-08 RX ORDER — CLINDAMYCIN PHOSPHATE 600 MG/50ML
600 INJECTION, SOLUTION INTRAVENOUS
Status: COMPLETED | OUTPATIENT
Start: 2025-05-08 | End: 2025-05-08

## 2025-05-08 RX ADMIN — KETOROLAC TROMETHAMINE 15 MG: 15 INJECTION, SOLUTION INTRAMUSCULAR; INTRAVENOUS at 15:46

## 2025-05-08 RX ADMIN — CLINDAMYCIN PHOSPHATE 600 MG: 600 INJECTION, SOLUTION INTRAVENOUS at 15:52

## 2025-05-08 ASSESSMENT — PAIN DESCRIPTION - ORIENTATION
ORIENTATION: RIGHT
ORIENTATION: RIGHT

## 2025-05-08 ASSESSMENT — PAIN SCALES - GENERAL
PAINLEVEL_OUTOF10: 10
PAINLEVEL_OUTOF10: 8

## 2025-05-08 ASSESSMENT — LIFESTYLE VARIABLES
HOW OFTEN DO YOU HAVE A DRINK CONTAINING ALCOHOL: NEVER
HOW MANY STANDARD DRINKS CONTAINING ALCOHOL DO YOU HAVE ON A TYPICAL DAY: PATIENT DOES NOT DRINK

## 2025-05-08 ASSESSMENT — PAIN - FUNCTIONAL ASSESSMENT: PAIN_FUNCTIONAL_ASSESSMENT: 0-10

## 2025-05-08 ASSESSMENT — PAIN DESCRIPTION - LOCATION
LOCATION: ABDOMEN
LOCATION: ABDOMEN

## 2025-05-08 NOTE — DISCHARGE INSTRUCTIONS
As we discussed, your workup today was reassuring.  Your blood work did not show any signs of severe infection.  I would still like to discharge you on oral antibiotics to treat the skin infection you have on your abdomen.  I also recommend using some antibiotic ointment on the wound such as Neosporin or bacitracin.  You may buy these over-the-counter.  I will also give you a prescription for clindamycin to take over the next 10 days at home.  Please follow-up with your family doctor in a couple days as scheduled.  If you experience any new or worsening symptoms such as fever, spreading redness please return to the ER for reevaluation.    It was a pleasure taking care of you today.  We sincerely hope that you feel better soon.

## 2025-05-08 NOTE — ED PROVIDER NOTES
Emergency Department Provider Note       [facility] EMERGENCY DEPT   PCP: Gloria Mckay PA   Age: 30 y.o.   Sex: female     DISPOSITION Decision To Discharge 05/08/2025 04:39:34 PM    ICD-10-CM    1. Cellulitis of abdominal wall  L03.311           Medical Decision Making     Patient presents with worsening wound infection over the past 1 to 2 weeks.  She is otherwise well-appearing.  Vital signs are stable.  She is afebrile, without tachycardia or tachypnea.  On exam, she does have 3 erythematous lesions present to her right upper quadrant on her abdomen.  They are indurated on palpation and tender.  The largest is approximately 0.5 cm in diameter and has some purulent drainage present.  Will obtain basic labs, lactate, Procal, blood cultures.  Patient reports pain is 10 out of 10 but does not want any opioid pain medication as she is currently in rehab.  She has allergies listed to Toradol and Tylenol.    Workup showed unremarkable CBC and CMP.  No leukocytosis.  Lactic and procalcitonin WNL.  Blood cultures pending.    Patient denies allergy to Toradol, states she does tolerate.  Will give dose of Toradol and clindamycin via IV.  Will discharge home on oral clindamycin.  Patient has follow-up appointment with primary care provider in a couple days.  Discussed return precautions.  Patient verbalized understanding is agreeable for discharge home.       1 or more acute illnesses that pose a threat to life or bodily function.   Prescription drug management performed.  Patient was discharged risks and benefits of hospitalization were considered.  Shared medical decision making was utilized in creating the patients health plan today.  I independently ordered and reviewed each unique test.                         History     Patient is a 30-year-old female with past medical history significant for GERD, bipolar disorder, drug abuse, ADHD, asthma, facial cellulitis who presents today to the ER with chief complaint

## 2025-05-08 NOTE — ED TRIAGE NOTES
Patient has 3 wounds on the right side of her stomach. They have been there for a couple weeks and they are not getting better.     She did have one pop and puss/blood came out of it.

## 2025-05-11 LAB
BACTERIA SPEC CULT: NORMAL
BACTERIA SPEC CULT: NORMAL
SERVICE CMNT-IMP: NORMAL
SERVICE CMNT-IMP: NORMAL

## 2025-05-16 PROBLEM — J45.40 MODERATE PERSISTENT ASTHMA WITHOUT COMPLICATION: Status: ACTIVE | Noted: 2025-05-16

## 2025-05-16 PROBLEM — L03.211 FACIAL CELLULITIS: Status: RESOLVED | Noted: 2023-04-01 | Resolved: 2025-05-16

## 2025-05-16 PROBLEM — J30.81 ALLERGIC RHINITIS DUE TO ANIMAL HAIR AND DANDER: Status: ACTIVE | Noted: 2025-05-16

## 2025-05-16 PROBLEM — K80.20 CALCULUS OF GALLBLADDER WITHOUT CHOLECYSTITIS WITHOUT OBSTRUCTION: Status: RESOLVED | Noted: 2017-12-10 | Resolved: 2025-05-16
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
## 2025-05-16 PROBLEM — L84 HEEL CALLUS: Status: RESOLVED | Noted: 2018-09-14 | Resolved: 2025-05-16

## 2025-05-16 PROBLEM — K80.20 CALCULUS OF GALLBLADDER WITHOUT CHOLECYSTITIS WITHOUT OBSTRUCTION: Status: ACTIVE | Noted: 2017-12-10

## 2025-05-19 ENCOUNTER — OFFICE VISIT (OUTPATIENT)
Dept: FAMILY MEDICINE CLINIC | Facility: CLINIC | Age: 30
End: 2025-05-19

## 2025-05-19 VITALS
BODY MASS INDEX: 48.82 KG/M2 | WEIGHT: 293 LBS | HEIGHT: 65 IN | TEMPERATURE: 98.1 F | OXYGEN SATURATION: 98 % | RESPIRATION RATE: 16 BRPM | DIASTOLIC BLOOD PRESSURE: 101 MMHG | HEART RATE: 107 BPM | SYSTOLIC BLOOD PRESSURE: 153 MMHG

## 2025-05-19 DIAGNOSIS — F41.9 ANXIETY: ICD-10-CM

## 2025-05-19 DIAGNOSIS — Z01.419 PERIODIC HEALTH ASSESSMENT, PAP AND PELVIC: ICD-10-CM

## 2025-05-19 DIAGNOSIS — J45.40 MODERATE PERSISTENT ASTHMA WITHOUT COMPLICATION: ICD-10-CM

## 2025-05-19 DIAGNOSIS — K21.01 GASTROESOPHAGEAL REFLUX DISEASE WITH ESOPHAGITIS AND HEMORRHAGE: ICD-10-CM

## 2025-05-19 DIAGNOSIS — Z00.00 PHYSICAL EXAM, ANNUAL: Primary | ICD-10-CM

## 2025-05-19 DIAGNOSIS — Z72.821 POOR SLEEP HYGIENE: ICD-10-CM

## 2025-05-19 DIAGNOSIS — F17.210 CIGARETTE SMOKER ONE HALF PACK A DAY OR LESS: ICD-10-CM

## 2025-05-19 DIAGNOSIS — J30.81 ALLERGIC RHINITIS DUE TO ANIMAL HAIR AND DANDER: ICD-10-CM

## 2025-05-19 DIAGNOSIS — I10 PRIMARY HYPERTENSION: ICD-10-CM

## 2025-05-19 DIAGNOSIS — F31.9 BIPOLAR AFFECTIVE DISORDER, REMISSION STATUS UNSPECIFIED (HCC): ICD-10-CM

## 2025-05-19 DIAGNOSIS — R11.0 NAUSEA: ICD-10-CM

## 2025-05-19 DIAGNOSIS — G47.33 OSA ON CPAP: ICD-10-CM

## 2025-05-19 DIAGNOSIS — F90.9 ADULT ADHD (ATTENTION DEFICIT HYPERACTIVITY DISORDER): ICD-10-CM

## 2025-05-19 PROBLEM — J45.909 ASTHMA: Status: RESOLVED | Noted: 2018-03-30 | Resolved: 2025-05-19

## 2025-05-19 PROBLEM — Z86.19 HISTORY OF HELICOBACTER INFECTION: Status: RESOLVED | Noted: 2019-08-06 | Resolved: 2025-05-19

## 2025-05-19 PROBLEM — J30.1 NON-SEASONAL ALLERGIC RHINITIS DUE TO POLLEN: Status: RESOLVED | Noted: 2021-04-20 | Resolved: 2025-05-19

## 2025-05-19 PROBLEM — Z87.828 HISTORY OF MOTOR VEHICLE ACCIDENT: Status: RESOLVED | Noted: 2018-03-20 | Resolved: 2025-05-19

## 2025-05-19 RX ORDER — LORATADINE 10 MG/1
10 TABLET ORAL DAILY
Qty: 90 TABLET | Refills: 0 | Status: SHIPPED | OUTPATIENT
Start: 2025-05-19 | End: 2025-08-17

## 2025-05-19 RX ORDER — ATOMOXETINE 80 MG/1
80 CAPSULE ORAL DAILY
Qty: 90 CAPSULE | Refills: 0 | Status: SHIPPED | OUTPATIENT
Start: 2025-05-19

## 2025-05-19 RX ORDER — BUSPIRONE HYDROCHLORIDE 10 MG/1
10 TABLET ORAL 2 TIMES DAILY
Qty: 180 TABLET | Refills: 0 | Status: SHIPPED | OUTPATIENT
Start: 2025-05-19 | End: 2025-08-17

## 2025-05-19 RX ORDER — OMEPRAZOLE 40 MG/1
40 CAPSULE, DELAYED RELEASE ORAL
Qty: 90 CAPSULE | Refills: 0 | Status: SHIPPED | OUTPATIENT
Start: 2025-05-19 | End: 2025-08-17

## 2025-05-19 RX ORDER — METOPROLOL SUCCINATE 25 MG/1
25 TABLET, EXTENDED RELEASE ORAL DAILY
Qty: 90 TABLET | Refills: 0 | Status: SHIPPED | OUTPATIENT
Start: 2025-05-19

## 2025-05-19 RX ORDER — BUDESONIDE AND FORMOTEROL FUMARATE DIHYDRATE 160; 4.5 UG/1; UG/1
2 AEROSOL RESPIRATORY (INHALATION) 2 TIMES DAILY
Qty: 30.6 G | Refills: 0 | Status: SHIPPED | OUTPATIENT
Start: 2025-05-19

## 2025-05-19 RX ORDER — QUETIAPINE FUMARATE 100 MG/1
100 TABLET, FILM COATED ORAL EVERY EVENING
Qty: 90 TABLET | Refills: 0 | Status: SHIPPED | OUTPATIENT
Start: 2025-05-19

## 2025-05-19 RX ORDER — ONDANSETRON 4 MG/1
4 TABLET, ORALLY DISINTEGRATING ORAL 3 TIMES DAILY PRN
Qty: 21 TABLET | Refills: 0 | Status: SHIPPED | OUTPATIENT
Start: 2025-05-19

## 2025-05-19 RX ORDER — ALBUTEROL SULFATE 90 UG/1
2 INHALANT RESPIRATORY (INHALATION) EVERY 6 HOURS PRN
Qty: 18 G | Refills: 1 | Status: SHIPPED | OUTPATIENT
Start: 2025-05-19

## 2025-05-19 RX ORDER — MONTELUKAST SODIUM 10 MG/1
10 TABLET ORAL DAILY
Qty: 30 TABLET | Refills: 0 | Status: SHIPPED | OUTPATIENT
Start: 2025-05-19

## 2025-05-19 RX ORDER — HYDROXYZINE PAMOATE 50 MG/1
50 CAPSULE ORAL NIGHTLY
Qty: 90 CAPSULE | Refills: 0 | Status: SHIPPED | OUTPATIENT
Start: 2025-05-19 | End: 2025-08-17

## 2025-05-19 SDOH — ECONOMIC STABILITY: FOOD INSECURITY: WITHIN THE PAST 12 MONTHS, THE FOOD YOU BOUGHT JUST DIDN'T LAST AND YOU DIDN'T HAVE MONEY TO GET MORE.: NEVER TRUE

## 2025-05-19 SDOH — ECONOMIC STABILITY: FOOD INSECURITY: WITHIN THE PAST 12 MONTHS, YOU WORRIED THAT YOUR FOOD WOULD RUN OUT BEFORE YOU GOT MONEY TO BUY MORE.: NEVER TRUE

## 2025-05-19 ASSESSMENT — ENCOUNTER SYMPTOMS
RHINORRHEA: 0
EYE PAIN: 0
BACK PAIN: 1
CONSTIPATION: 0
DIARRHEA: 0
CHEST TIGHTNESS: 0
SHORTNESS OF BREATH: 0
SINUS PRESSURE: 0
WHEEZING: 0
EYE DISCHARGE: 0

## 2025-05-19 ASSESSMENT — PATIENT HEALTH QUESTIONNAIRE - PHQ9
4. FEELING TIRED OR HAVING LITTLE ENERGY: NEARLY EVERY DAY
8. MOVING OR SPEAKING SO SLOWLY THAT OTHER PEOPLE COULD HAVE NOTICED. OR THE OPPOSITE, BEING SO FIGETY OR RESTLESS THAT YOU HAVE BEEN MOVING AROUND A LOT MORE THAN USUAL: NEARLY EVERY DAY
6. FEELING BAD ABOUT YOURSELF - OR THAT YOU ARE A FAILURE OR HAVE LET YOURSELF OR YOUR FAMILY DOWN: NOT AT ALL
SUM OF ALL RESPONSES TO PHQ QUESTIONS 1-9: 9
7. TROUBLE CONCENTRATING ON THINGS, SUCH AS READING THE NEWSPAPER OR WATCHING TELEVISION: NOT AT ALL
10. IF YOU CHECKED OFF ANY PROBLEMS, HOW DIFFICULT HAVE THESE PROBLEMS MADE IT FOR YOU TO DO YOUR WORK, TAKE CARE OF THINGS AT HOME, OR GET ALONG WITH OTHER PEOPLE: SOMEWHAT DIFFICULT
9. THOUGHTS THAT YOU WOULD BE BETTER OFF DEAD, OR OF HURTING YOURSELF: NOT AT ALL
SUM OF ALL RESPONSES TO PHQ QUESTIONS 1-9: 9
3. TROUBLE FALLING OR STAYING ASLEEP: NEARLY EVERY DAY
5. POOR APPETITE OR OVEREATING: NOT AT ALL
1. LITTLE INTEREST OR PLEASURE IN DOING THINGS: NOT AT ALL
2. FEELING DOWN, DEPRESSED OR HOPELESS: NOT AT ALL
SUM OF ALL RESPONSES TO PHQ QUESTIONS 1-9: 9
SUM OF ALL RESPONSES TO PHQ QUESTIONS 1-9: 9

## 2025-05-19 NOTE — PROGRESS NOTES
depressed, or hopeless 0 0 2   Trouble falling or staying asleep, or sleeping too much 3     Feeling tired or having little energy 3     Poor appetite or overeating 0     Feeling bad about yourself - or that you are a failure or have let yourself or your family down 0     Trouble concentrating on things, such as reading the newspaper or watching television 0     Moving or speaking so slowly that other people could have noticed. Or the opposite - being so fidgety or restless that you have been moving around a lot more than usual 3     Thoughts that you would be better off dead, or of hurting yourself in some way 0     PHQ-2 Score 0 0 2   Total Score PHQ 2  0 2   PHQ-9 Total Score 9 0 2   If you checked off any problems, how difficult have these problems made it for you to do your work, take care of things at home, or get along with other people? 1         Orders Placed This Encounter    Comprehensive Metabolic Panel     Standing Status:   Future     Expected Date:   5/19/2025     Expiration Date:   8/19/2025    Lipid Panel     Standing Status:   Future     Expected Date:   5/19/2025     Expiration Date:   8/19/2025     Is Patient Fasting?/# of Hours:   No    TSH     Standing Status:   Future     Expected Date:   5/19/2025     Expiration Date:   8/19/2025    CBC with Auto Differential     Standing Status:   Future     Expected Date:   5/19/2025     Expiration Date:   8/19/2025    PAP IG, Aptima HPV and rfx 16/18,45 ([id number])     Standing Status:   Future     Expected Date:   5/19/2025     Expiration Date:   6/19/2025     Pap Source?          (Required):   CERVIX     Pap collection method?          (Required):   BROOM-ALONE     Date of LMP?          (Required):   5/1/2025     Previous Treatment?          (Required):   NONE    [facility]     Referral Priority:   Routine     Referral Type:   Eval and Treat     Referral Reason:   Specialty Services Required     Number of Visits Requested:

## 2025-05-19 NOTE — ASSESSMENT & PLAN NOTE
She was taking Strattera daily for ADHD while incarcerated.  Has been out of medicine for 2 to 3 weeks now.  Will get her restarted.    Orders:    atomoxetine (STRATTERA) 80 MG capsule; Take 1 capsule by mouth daily

## 2025-05-19 NOTE — ASSESSMENT & PLAN NOTE
He used to be on a CPAP.  She states that she lost her machine in prior moves.  Will place a referral for her to see sleep medicine take get restarted with treatment.    Orders:    [facility], [facility]

## 2025-05-19 NOTE — ASSESSMENT & PLAN NOTE
She has chronic anxiety.  Will get her restarted on hydroxyzine at at bedtime and BuSpar 10 mg twice daily.    Orders:    hydrOXYzine pamoate (VISTARIL) 50 MG capsule; Take 1 capsule by mouth at bedtime    busPIRone (BUSPAR) 10 MG tablet; Take 1 tablet by mouth 2 times daily

## 2025-05-19 NOTE — ASSESSMENT & PLAN NOTE
Will restart her on omeprazole 40 mg once every morning for management of GERD symptoms.    Orders:    omeprazole (PRILOSEC) 40 MG delayed release capsule; Take 1 capsule by mouth every morning (before breakfast)

## 2025-05-19 NOTE — ASSESSMENT & PLAN NOTE
She has a history of insomnia due to poor sleep hygiene.  Will get her restarted on hydroxyzine at at bedtime and Seroquel at at bedtime.    Orders:    hydrOXYzine pamoate (VISTARIL) 50 MG capsule; Take 1 capsule by mouth at bedtime    QUEtiapine (SEROQUEL) 100 MG tablet; Take 1 tablet by mouth every evening

## 2025-05-19 NOTE — ASSESSMENT & PLAN NOTE
Will restart her on Claritin and Singulair for allergic rhinitis symptoms.    Orders:    loratadine (CLARITIN) 10 MG tablet; Take 1 tablet by mouth daily    montelukast (SINGULAIR) 10 MG tablet; Take 1 tablet by mouth daily

## 2025-05-19 NOTE — ASSESSMENT & PLAN NOTE
Last asthma attack occurred while incarcerated.  Again, encouraged her to quit smoking.  Will refill prescriptions for Symbicort 2 puffs twice daily and will provide her with albuterol to use as a rescue inhaler.    Orders:    budesonide-formoterol (SYMBICORT) 160-4.5 MCG/ACT AERO; Inhale 2 puffs into the lungs 2 times daily    albuterol sulfate HFA (PROVENTIL;VENTOLIN;PROAIR) 108 (90 Base) MCG/ACT inhaler; Inhale 2 puffs into the lungs every 6 hours as needed for Wheezing or Shortness of Breath

## 2025-05-19 NOTE — ASSESSMENT & PLAN NOTE
Start her on Seroquel 100 mg at bedtime.  She is no longer followed by psychiatry.  May discuss placing a referral for her at next visit.    Orders:    QUEtiapine (SEROQUEL) 100 MG tablet; Take 1 tablet by mouth every evening     No

## 2025-05-20 ENCOUNTER — TELEPHONE (OUTPATIENT)
Dept: FAMILY MEDICINE CLINIC | Facility: CLINIC | Age: 30
End: 2025-05-20

## 2025-05-20 DIAGNOSIS — Z01.419 PERIODIC HEALTH ASSESSMENT, PAP AND PELVIC: ICD-10-CM

## 2025-05-20 DIAGNOSIS — Z12.12 SCREENING FOR COLORECTAL CANCER: Primary | ICD-10-CM

## 2025-05-20 DIAGNOSIS — Z12.11 SCREENING FOR COLORECTAL CANCER: Primary | ICD-10-CM

## 2025-05-20 NOTE — TELEPHONE ENCOUNTER
----- Message from CAMILLA Cavanaugh sent at 5/19/2025  3:39 PM EDT -----  Regarding: Re:  Colonoscopy recommendations  Pt had a colonoscopy on 6/19/19 w/ Dr. Pradhan/[facility].  Please contact their office to see when they recommend repeat screening.     Gloria

## 2025-05-20 NOTE — TELEPHONE ENCOUNTER
I spoke with Afshan at [facility].  's note shows that patient should have scheduled recheck in 3 years from last visit.

## 2025-05-20 NOTE — TELEPHONE ENCOUNTER
Please let pt know that per GI, she is overdue to repeat a colonoscopy.  Someone from [facility] will be contacting her to schedule

## 2025-05-22 ENCOUNTER — PREP FOR PROCEDURE (OUTPATIENT)
Age: 30
End: 2025-05-22

## 2025-05-22 ENCOUNTER — LAB (OUTPATIENT)
Dept: FAMILY MEDICINE CLINIC | Facility: CLINIC | Age: 30
End: 2025-05-22

## 2025-05-22 DIAGNOSIS — Z00.00 PHYSICAL EXAM, ANNUAL: ICD-10-CM

## 2025-05-22 DIAGNOSIS — Z86.0100 HISTORY OF COLON POLYPS: ICD-10-CM

## 2025-05-22 LAB
ALBUMIN SERPL-MCNC: 3.2 G/DL (ref 3.5–5)
ALBUMIN/GLOB SERPL: 0.9 (ref 1–1.9)
ALP SERPL-CCNC: 86 U/L (ref 35–104)
ALT SERPL-CCNC: 13 U/L (ref 8–45)
ANION GAP SERPL CALC-SCNC: 10 MMOL/L (ref 7–16)
AST SERPL-CCNC: 18 U/L (ref 15–37)
BASOPHILS # BLD: 0.05 K/UL (ref 0–0.2)
BASOPHILS NFR BLD: 0.7 % (ref 0–2)
BILIRUB SERPL-MCNC: 0.3 MG/DL (ref 0–1.2)
BUN SERPL-MCNC: 15 MG/DL (ref 6–23)
CALCIUM SERPL-MCNC: 9.2 MG/DL (ref 8.8–10.2)
CHLORIDE SERPL-SCNC: 106 MMOL/L (ref 98–107)
CHOLEST SERPL-MCNC: 130 MG/DL (ref 0–200)
CO2 SERPL-SCNC: 25 MMOL/L (ref 20–29)
CREAT SERPL-MCNC: 0.64 MG/DL (ref 0.6–1.1)
DIFFERENTIAL METHOD BLD: NORMAL
EOSINOPHIL # BLD: 0.25 K/UL (ref 0–0.8)
EOSINOPHIL NFR BLD: 3.3 % (ref 0.5–7.8)
ERYTHROCYTE [DISTWIDTH] IN BLOOD BY AUTOMATED COUNT: 13.5 % (ref 11.9–14.6)
GLOBULIN SER CALC-MCNC: 3.7 G/DL (ref 2.3–3.5)
GLUCOSE SERPL-MCNC: 109 MG/DL (ref 70–99)
HCT VFR BLD AUTO: 38.7 % (ref 35.8–46.3)
HDLC SERPL-MCNC: 43 MG/DL (ref 40–60)
HDLC SERPL: 3 (ref 0–5)
HGB BLD-MCNC: 12.7 G/DL (ref 11.7–15.4)
IMM GRANULOCYTES # BLD AUTO: 0.03 K/UL (ref 0–0.5)
IMM GRANULOCYTES NFR BLD AUTO: 0.4 % (ref 0–5)
LDLC SERPL CALC-MCNC: 65 MG/DL (ref 0–100)
LYMPHOCYTES # BLD: 2.49 K/UL (ref 0.5–4.6)
LYMPHOCYTES NFR BLD: 32.5 % (ref 13–44)
MCH RBC QN AUTO: 27.8 PG (ref 26.1–32.9)
MCHC RBC AUTO-ENTMCNC: 32.8 G/DL (ref 31.4–35)
MCV RBC AUTO: 84.7 FL (ref 82–102)
MONOCYTES # BLD: 0.46 K/UL (ref 0.1–1.3)
MONOCYTES NFR BLD: 6 % (ref 4–12)
NEUTS SEG # BLD: 4.39 K/UL (ref 1.7–8.2)
NEUTS SEG NFR BLD: 57.1 % (ref 43–78)
NRBC # BLD: 0 K/UL (ref 0–0.2)
PLATELET # BLD AUTO: 316 K/UL (ref 150–450)
PMV BLD AUTO: 10.1 FL (ref 9.4–12.3)
POTASSIUM SERPL-SCNC: 3.9 MMOL/L (ref 3.5–5.1)
PROT SERPL-MCNC: 6.9 G/DL (ref 6.3–8.2)
RBC # BLD AUTO: 4.57 M/UL (ref 4.05–5.2)
SODIUM SERPL-SCNC: 141 MMOL/L (ref 136–145)
TRIGL SERPL-MCNC: 110 MG/DL (ref 0–150)
TSH, 3RD GENERATION: 1.27 UIU/ML (ref 0.27–4.2)
VLDLC SERPL CALC-MCNC: 22 MG/DL (ref 6–23)
WBC # BLD AUTO: 7.7 K/UL (ref 4.3–11.1)

## 2025-05-22 RX ORDER — SODIUM CHLORIDE 0.9 % (FLUSH) 0.9 %
5-40 SYRINGE (ML) INJECTION PRN
Status: CANCELLED | OUTPATIENT
Start: 2025-05-22

## 2025-05-22 RX ORDER — SODIUM CHLORIDE 9 MG/ML
25 INJECTION, SOLUTION INTRAVENOUS PRN
Status: CANCELLED | OUTPATIENT
Start: 2025-05-22

## 2025-05-22 RX ORDER — SODIUM CHLORIDE 0.9 % (FLUSH) 0.9 %
5-40 SYRINGE (ML) INJECTION EVERY 12 HOURS SCHEDULED
Status: CANCELLED | OUTPATIENT
Start: 2025-05-22

## 2025-05-23 ENCOUNTER — RESULTS FOLLOW-UP (OUTPATIENT)
Dept: FAMILY MEDICINE CLINIC | Facility: CLINIC | Age: 30
End: 2025-05-23

## 2025-05-23 DIAGNOSIS — R87.810 CERVICAL HIGH RISK HPV (HUMAN PAPILLOMAVIRUS) TEST POSITIVE: Primary | ICD-10-CM

## 2025-05-29 PROBLEM — R87.810 CERVICAL HIGH RISK HPV (HUMAN PAPILLOMAVIRUS) TEST POSITIVE: Status: ACTIVE | Noted: 2025-05-29

## 2025-05-29 LAB
COLLECTION METHOD: ABNORMAL
CYTOLOGIST CVX/VAG CYTO: ABNORMAL
CYTOLOGY CVX/VAG DOC THIN PREP: ABNORMAL
DATE OF LMP: ABNORMAL
HPV APTIMA: POSITIVE
HPV GENOTYPE 18,45: NEGATIVE
HPV GENOTYPE REFLEX: ABNORMAL
HPV, GENOTYPE 16: NEGATIVE
Lab: ABNORMAL
Lab: ABNORMAL
PAP SOURCE: ABNORMAL
PATH REPORT.FINAL DX SPEC: ABNORMAL
PREV TREATMENT: ABNORMAL
STAT OF ADQ CVX/VAG CYTO-IMP: ABNORMAL

## 2025-06-08 DIAGNOSIS — J30.81 ALLERGIC RHINITIS DUE TO ANIMAL HAIR AND DANDER: ICD-10-CM

## 2025-06-09 RX ORDER — MONTELUKAST SODIUM 10 MG/1
10 TABLET ORAL DAILY
Qty: 30 TABLET | Refills: 0 | OUTPATIENT
Start: 2025-06-09

## 2025-06-11 DIAGNOSIS — R06.83 SNORING: Primary | ICD-10-CM

## 2025-06-21 DIAGNOSIS — J30.81 ALLERGIC RHINITIS DUE TO ANIMAL HAIR AND DANDER: ICD-10-CM

## 2025-06-22 RX ORDER — MONTELUKAST SODIUM 10 MG/1
10 TABLET ORAL DAILY
Qty: 30 TABLET | Refills: 0 | OUTPATIENT
Start: 2025-06-22

## 2025-07-11 ENCOUNTER — TELEPHONE (OUTPATIENT)
Dept: FAMILY MEDICINE CLINIC | Facility: CLINIC | Age: 30
End: 2025-07-11

## 2025-07-11 DIAGNOSIS — F31.9 BIPOLAR AFFECTIVE DISORDER, REMISSION STATUS UNSPECIFIED (HCC): ICD-10-CM

## 2025-07-11 DIAGNOSIS — Z72.821 POOR SLEEP HYGIENE: ICD-10-CM

## 2025-07-11 DIAGNOSIS — I10 PRIMARY HYPERTENSION: ICD-10-CM

## 2025-07-11 DIAGNOSIS — F41.9 ANXIETY: ICD-10-CM

## 2025-07-11 DIAGNOSIS — K21.01 GASTROESOPHAGEAL REFLUX DISEASE WITH ESOPHAGITIS AND HEMORRHAGE: ICD-10-CM

## 2025-07-11 DIAGNOSIS — J45.40 MODERATE PERSISTENT ASTHMA WITHOUT COMPLICATION: ICD-10-CM

## 2025-07-11 DIAGNOSIS — J30.81 ALLERGIC RHINITIS DUE TO ANIMAL HAIR AND DANDER: ICD-10-CM

## 2025-07-11 NOTE — TELEPHONE ENCOUNTER
Patient stated that she has moved back to [place] and is looking for a pcp closer to where she is staying.  Requesting refill for all of her medications   Please send Rx's to [facility] on [address] in [place].

## 2025-07-14 RX ORDER — LORATADINE 10 MG/1
10 TABLET ORAL DAILY
Qty: 90 TABLET | Refills: 0 | Status: SHIPPED | OUTPATIENT
Start: 2025-07-14 | End: 2025-10-12

## 2025-07-14 RX ORDER — HYDROXYZINE PAMOATE 50 MG/1
50 CAPSULE ORAL NIGHTLY
Qty: 90 CAPSULE | Refills: 0 | Status: SHIPPED | OUTPATIENT
Start: 2025-07-14 | End: 2025-10-12

## 2025-07-14 RX ORDER — BUDESONIDE AND FORMOTEROL FUMARATE DIHYDRATE 160; 4.5 UG/1; UG/1
2 AEROSOL RESPIRATORY (INHALATION) 2 TIMES DAILY
Qty: 30.6 G | Refills: 0 | Status: SHIPPED | OUTPATIENT
Start: 2025-07-14

## 2025-07-14 RX ORDER — MONTELUKAST SODIUM 10 MG/1
10 TABLET ORAL DAILY
Qty: 30 TABLET | Refills: 0 | Status: SHIPPED | OUTPATIENT
Start: 2025-07-14

## 2025-07-14 RX ORDER — ALBUTEROL SULFATE 90 UG/1
2 INHALANT RESPIRATORY (INHALATION) EVERY 6 HOURS PRN
Qty: 18 G | Refills: 0 | Status: SHIPPED | OUTPATIENT
Start: 2025-07-14

## 2025-07-14 RX ORDER — QUETIAPINE FUMARATE 100 MG/1
100 TABLET, FILM COATED ORAL EVERY EVENING
Qty: 90 TABLET | Refills: 0 | Status: SHIPPED | OUTPATIENT
Start: 2025-07-14

## 2025-07-14 RX ORDER — METOPROLOL SUCCINATE 25 MG/1
25 TABLET, EXTENDED RELEASE ORAL DAILY
Qty: 90 TABLET | Refills: 0 | Status: SHIPPED | OUTPATIENT
Start: 2025-07-14

## 2025-07-14 RX ORDER — BUSPIRONE HYDROCHLORIDE 10 MG/1
10 TABLET ORAL 2 TIMES DAILY
Qty: 180 TABLET | Refills: 0 | Status: SHIPPED | OUTPATIENT
Start: 2025-07-14 | End: 2025-10-12

## 2025-07-14 RX ORDER — OMEPRAZOLE 40 MG/1
40 CAPSULE, DELAYED RELEASE ORAL
Qty: 90 CAPSULE | Refills: 0 | Status: SHIPPED | OUTPATIENT
Start: 2025-07-14 | End: 2025-10-12

## 2025-07-14 NOTE — TELEPHONE ENCOUNTER
Left message via patient's voice mail letting her know that 3 month refills were sent to the requested pharmacy.

## 2025-07-14 NOTE — TELEPHONE ENCOUNTER
I'll send in a 3 mo supply to cover her until she can get in to see a new provider.  If she cannot get in to see someone before her refills run out, I will need to see her back again.

## 2025-07-24 ENCOUNTER — TELEPHONE (OUTPATIENT)
Dept: FAMILY MEDICINE CLINIC | Facility: CLINIC | Age: 30
End: 2025-07-24

## 2025-07-24 NOTE — TELEPHONE ENCOUNTER
Patient called and said she needs a letter to give to [facility] and for her probation stating that the medication she takes will give a false positive for fentanyl and suboxone.

## 2025-07-25 NOTE — TELEPHONE ENCOUNTER
I reviewed her medication list and none of the ones I've prescribed for her could cause a false positive for fentanyl nor suboxone.